# Patient Record
Sex: MALE | Race: WHITE | NOT HISPANIC OR LATINO | Employment: OTHER | URBAN - METROPOLITAN AREA
[De-identification: names, ages, dates, MRNs, and addresses within clinical notes are randomized per-mention and may not be internally consistent; named-entity substitution may affect disease eponyms.]

---

## 2018-06-06 ENCOUNTER — OFFICE VISIT CONVERTED (OUTPATIENT)
Dept: ONCOLOGY | Facility: HOSPITAL | Age: 58
End: 2018-06-06
Attending: INTERNAL MEDICINE

## 2018-06-28 ENCOUNTER — TELEPHONE CONVERTED (OUTPATIENT)
Dept: ONCOLOGY | Facility: HOSPITAL | Age: 58
End: 2018-06-28

## 2018-07-02 ENCOUNTER — OFFICE VISIT CONVERTED (OUTPATIENT)
Dept: ONCOLOGY | Facility: HOSPITAL | Age: 58
End: 2018-07-02
Attending: INTERNAL MEDICINE

## 2018-07-09 PROCEDURE — 87205 SMEAR GRAM STAIN: CPT | Performed by: ORTHOPAEDIC SURGERY

## 2018-07-09 PROCEDURE — 87070 CULTURE OTHR SPECIMN AEROBIC: CPT | Performed by: ORTHOPAEDIC SURGERY

## 2018-07-10 ENCOUNTER — LAB REQUISITION (OUTPATIENT)
Dept: LAB | Facility: HOSPITAL | Age: 58
End: 2018-07-10

## 2018-07-10 DIAGNOSIS — L08.9 LOCAL INFECTION OF SKIN AND SUBCUTANEOUS TISSUE: ICD-10-CM

## 2018-07-11 ENCOUNTER — OFFICE VISIT CONVERTED (OUTPATIENT)
Dept: ONCOLOGY | Facility: HOSPITAL | Age: 58
End: 2018-07-11
Attending: INTERNAL MEDICINE

## 2018-07-12 LAB
BACTERIA SPEC AEROBE CULT: NORMAL
GRAM STN SPEC: NORMAL
GRAM STN SPEC: NORMAL

## 2018-10-24 ENCOUNTER — OFFICE VISIT CONVERTED (OUTPATIENT)
Dept: ONCOLOGY | Facility: HOSPITAL | Age: 58
End: 2018-10-24
Attending: INTERNAL MEDICINE

## 2019-01-23 ENCOUNTER — HOSPITAL ENCOUNTER (OUTPATIENT)
Dept: CT IMAGING | Facility: HOSPITAL | Age: 59
Discharge: HOME | End: 2019-01-23
Attending: INTERNAL MEDICINE

## 2019-01-23 LAB
ALBUMIN SERPL-MCNC: 4.5 G/DL (ref 3.5–5)
ALBUMIN/GLOB SERPL: 2 {RATIO} (ref 1.4–2.6)
ALP SERPL-CCNC: 60 U/L (ref 56–119)
ALT SERPL-CCNC: 33 U/L (ref 10–40)
ANION GAP SERPL CALC-SCNC: 18 MMOL/L (ref 8–19)
AST SERPL-CCNC: 24 U/L (ref 15–50)
BASOPHILS # BLD AUTO: 0.07 10*3/UL (ref 0–0.2)
BASOPHILS NFR BLD AUTO: 0.93 % (ref 0–3)
BILIRUB SERPL-MCNC: 0.18 MG/DL (ref 0.2–1.3)
BUN SERPL-MCNC: 15 MG/DL (ref 5–25)
BUN/CREAT SERPL: 12 {RATIO} (ref 6–20)
CALCIUM SERPL-MCNC: 9.4 MG/DL (ref 8.7–10.4)
CHLORIDE SERPL-SCNC: 100 MMOL/L (ref 99–111)
CONV CO2: 24 MMOL/L (ref 22–32)
CONV TOTAL PROTEIN: 6.7 G/DL (ref 6.3–8.2)
CREAT UR-MCNC: 1.26 MG/DL (ref 0.7–1.2)
EOSINOPHIL # BLD AUTO: 0.53 10*3/UL (ref 0–0.7)
EOSINOPHIL # BLD AUTO: 7.03 % (ref 0–7)
ERYTHROCYTE [DISTWIDTH] IN BLOOD BY AUTOMATED COUNT: 12.3 % (ref 11.5–14.5)
GFR SERPLBLD BASED ON 1.73 SQ M-ARVRAT: >60 ML/MIN/{1.73_M2}
GLOBULIN UR ELPH-MCNC: 2.2 G/DL (ref 2–3.5)
GLUCOSE SERPL-MCNC: 140 MG/DL (ref 70–99)
HBA1C MFR BLD: 13 G/DL (ref 14–18)
HCT VFR BLD AUTO: 37.5 % (ref 42–52)
LDH SERPL-CCNC: 216 U/L (ref 120–240)
LYMPHOCYTES # BLD AUTO: 1.6 10*3/UL (ref 1–5)
MCH RBC QN AUTO: 32.4 PG (ref 27–31)
MCHC RBC AUTO-ENTMCNC: 34.6 G/DL (ref 33–37)
MCV RBC AUTO: 93.8 FL (ref 80–96)
MONOCYTES # BLD AUTO: 0.62 10*3/UL (ref 0.2–1.2)
MONOCYTES NFR BLD AUTO: 8.23 % (ref 3–10)
NEUTROPHILS # BLD AUTO: 4.67 10*3/UL (ref 2–8)
NEUTROPHILS NFR BLD AUTO: 62.4 % (ref 30–85)
NRBC BLD AUTO-RTO: 0 % (ref 0–0.01)
OSMOLALITY SERPL CALC.SUM OF ELEC: 289 MOSM/KG (ref 273–304)
PLATELET # BLD AUTO: 204 10*3/UL (ref 130–400)
PMV BLD AUTO: 8 FL (ref 7.4–10.4)
POTASSIUM SERPL-SCNC: 4 MMOL/L (ref 3.5–5.3)
RBC # BLD AUTO: 4 10*6/UL (ref 4.7–6.1)
SODIUM SERPL-SCNC: 138 MMOL/L (ref 135–147)
VARIANT LYMPHS NFR BLD MANUAL: 21.4 % (ref 20–45)
WBC # BLD AUTO: 7.48 10*3/UL (ref 4.8–10.8)

## 2019-02-21 ENCOUNTER — OFFICE VISIT CONVERTED (OUTPATIENT)
Dept: ONCOLOGY | Facility: HOSPITAL | Age: 59
End: 2019-02-21
Attending: INTERNAL MEDICINE

## 2019-02-21 ENCOUNTER — HOSPITAL ENCOUNTER (OUTPATIENT)
Dept: ONCOLOGY | Facility: HOSPITAL | Age: 59
Discharge: HOME | End: 2019-02-21
Attending: INTERNAL MEDICINE

## 2019-08-01 ENCOUNTER — HOSPITAL ENCOUNTER (OUTPATIENT)
Dept: OTHER | Facility: HOSPITAL | Age: 59
Discharge: HOME | End: 2019-08-01
Attending: INTERNAL MEDICINE

## 2019-08-01 ENCOUNTER — OFFICE VISIT CONVERTED (OUTPATIENT)
Dept: ONCOLOGY | Facility: HOSPITAL | Age: 59
End: 2019-08-01
Attending: INTERNAL MEDICINE

## 2019-08-01 LAB
ALBUMIN SERPL-MCNC: 4.4 G/DL (ref 3.5–5)
ALBUMIN/GLOB SERPL: 1.7 {RATIO} (ref 1.4–2.6)
ALP SERPL-CCNC: 53 U/L (ref 56–119)
ALT SERPL-CCNC: 15 U/L (ref 10–40)
ANION GAP SERPL CALC-SCNC: 18 MMOL/L (ref 8–19)
AST SERPL-CCNC: 19 U/L (ref 15–50)
BASOPHILS # BLD AUTO: 0.04 10*3/UL (ref 0–0.2)
BASOPHILS NFR BLD AUTO: 0.5 % (ref 0–3)
BILIRUB SERPL-MCNC: 0.17 MG/DL (ref 0.2–1.3)
BUN SERPL-MCNC: 14 MG/DL (ref 5–25)
BUN/CREAT SERPL: 10 {RATIO} (ref 6–20)
CALCIUM SERPL-MCNC: 9.1 MG/DL (ref 8.7–10.4)
CHLORIDE SERPL-SCNC: 102 MMOL/L (ref 99–111)
CONV ABS IMM GRAN: 0.04 10*3/UL (ref 0–0.2)
CONV CO2: 25 MMOL/L (ref 22–32)
CONV IMMATURE GRAN: 0.5 % (ref 0–1.8)
CONV TOTAL PROTEIN: 7 G/DL (ref 6.3–8.2)
CREAT UR-MCNC: 1.34 MG/DL (ref 0.7–1.2)
DEPRECATED RDW RBC AUTO: 45.9 FL (ref 35.1–43.9)
EOSINOPHIL # BLD AUTO: 0.38 10*3/UL (ref 0–0.7)
EOSINOPHIL # BLD AUTO: 5 % (ref 0–7)
ERYTHROCYTE [DISTWIDTH] IN BLOOD BY AUTOMATED COUNT: 13.4 % (ref 11.6–14.4)
GFR SERPLBLD BASED ON 1.73 SQ M-ARVRAT: 57 ML/MIN/{1.73_M2}
GLOBULIN UR ELPH-MCNC: 2.6 G/DL (ref 2–3.5)
GLUCOSE SERPL-MCNC: 96 MG/DL (ref 70–99)
HBA1C MFR BLD: 10.7 G/DL (ref 14–18)
HCT VFR BLD AUTO: 32.2 % (ref 42–52)
LDH SERPL-CCNC: 213 U/L (ref 120–240)
LYMPHOCYTES # BLD AUTO: 1.43 10*3/UL (ref 1–5)
MCH RBC QN AUTO: 31.2 PG (ref 27–31)
MCHC RBC AUTO-ENTMCNC: 33.2 G/DL (ref 33–37)
MCV RBC AUTO: 93.9 FL (ref 80–96)
MONOCYTES # BLD AUTO: 0.62 10*3/UL (ref 0.2–1.2)
MONOCYTES NFR BLD AUTO: 8.1 % (ref 3–10)
NEUTROPHILS # BLD AUTO: 5.16 10*3/UL (ref 2–8)
NEUTROPHILS NFR BLD AUTO: 67.3 % (ref 30–85)
NRBC CBCN: 0 % (ref 0–0.7)
OSMOLALITY SERPL CALC.SUM OF ELEC: 290 MOSM/KG (ref 273–304)
PLATELET # BLD AUTO: 178 10*3/UL (ref 130–400)
PMV BLD AUTO: 11.1 FL (ref 9.4–12.4)
POTASSIUM SERPL-SCNC: 4.5 MMOL/L (ref 3.5–5.3)
RBC # BLD AUTO: 3.43 10*6/UL (ref 4.7–6.1)
SODIUM SERPL-SCNC: 140 MMOL/L (ref 135–147)
VARIANT LYMPHS NFR BLD MANUAL: 18.6 % (ref 20–45)
WBC # BLD AUTO: 7.67 10*3/UL (ref 4.8–10.8)

## 2019-08-28 ENCOUNTER — HOSPITAL ENCOUNTER (OUTPATIENT)
Dept: ONCOLOGY | Facility: HOSPITAL | Age: 59
Discharge: HOME | End: 2019-08-28
Attending: INTERNAL MEDICINE

## 2019-08-28 ENCOUNTER — OFFICE VISIT CONVERTED (OUTPATIENT)
Dept: ONCOLOGY | Facility: HOSPITAL | Age: 59
End: 2019-08-28
Attending: INTERNAL MEDICINE

## 2019-10-03 ENCOUNTER — OFFICE VISIT CONVERTED (OUTPATIENT)
Dept: ONCOLOGY | Facility: HOSPITAL | Age: 59
End: 2019-10-03
Attending: INTERNAL MEDICINE

## 2019-10-03 ENCOUNTER — HOSPITAL ENCOUNTER (OUTPATIENT)
Dept: ONCOLOGY | Facility: HOSPITAL | Age: 59
Discharge: HOME | End: 2019-10-03
Attending: INTERNAL MEDICINE

## 2019-12-09 ENCOUNTER — HOSPITAL ENCOUNTER (OUTPATIENT)
Dept: CT IMAGING | Facility: HOSPITAL | Age: 59
Discharge: HOME | End: 2019-12-09
Attending: INTERNAL MEDICINE

## 2019-12-09 LAB
ALBUMIN SERPL-MCNC: 4.3 G/DL (ref 3.5–5)
ALBUMIN/GLOB SERPL: 1.4 {RATIO} (ref 1.4–2.6)
ALP SERPL-CCNC: 90 U/L (ref 56–119)
ALT SERPL-CCNC: 28 U/L (ref 10–40)
ANION GAP SERPL CALC-SCNC: 19 MMOL/L (ref 8–19)
AST SERPL-CCNC: 18 U/L (ref 15–50)
BASOPHILS # BLD AUTO: 0.08 10*3/UL (ref 0–0.2)
BASOPHILS NFR BLD AUTO: 1 % (ref 0–3)
BILIRUB SERPL-MCNC: 0.16 MG/DL (ref 0.2–1.3)
BUN SERPL-MCNC: 18 MG/DL (ref 5–25)
BUN/CREAT SERPL: 12 {RATIO} (ref 6–20)
CALCIUM SERPL-MCNC: 9.6 MG/DL (ref 8.7–10.4)
CHLORIDE SERPL-SCNC: 97 MMOL/L (ref 99–111)
CONV ABS IMM GRAN: 0.13 10*3/UL (ref 0–0.2)
CONV CO2: 26 MMOL/L (ref 22–32)
CONV IMMATURE GRAN: 1.7 % (ref 0–1.8)
CONV TOTAL PROTEIN: 7.3 G/DL (ref 6.3–8.2)
CREAT BLD-MCNC: 1.5 MG/DL (ref 0.6–1.4)
CREAT UR-MCNC: 1.45 MG/DL (ref 0.7–1.2)
DEPRECATED RDW RBC AUTO: 49.9 FL (ref 35.1–43.9)
EOSINOPHIL # BLD AUTO: 0.49 10*3/UL (ref 0–0.7)
EOSINOPHIL # BLD AUTO: 6.3 % (ref 0–7)
ERYTHROCYTE [DISTWIDTH] IN BLOOD BY AUTOMATED COUNT: 14.6 % (ref 11.6–14.4)
GFR SERPLBLD BASED ON 1.73 SQ M-ARVRAT: 50 ML/MIN/{1.73_M2}
GFR SERPLBLD BASED ON 1.73 SQ M-ARVRAT: 52 ML/MIN/{1.73_M2}
GLOBULIN UR ELPH-MCNC: 3 G/DL (ref 2–3.5)
GLUCOSE SERPL-MCNC: 111 MG/DL (ref 70–99)
HCT VFR BLD AUTO: 34.1 % (ref 42–52)
HGB BLD-MCNC: 11.4 G/DL (ref 14–18)
LDH SERPL-CCNC: 188 U/L (ref 120–240)
LYMPHOCYTES # BLD AUTO: 1.28 10*3/UL (ref 1–5)
LYMPHOCYTES NFR BLD AUTO: 16.3 % (ref 20–45)
MCH RBC QN AUTO: 30.7 PG (ref 27–31)
MCHC RBC AUTO-ENTMCNC: 33.4 G/DL (ref 33–37)
MCV RBC AUTO: 91.9 FL (ref 80–96)
MONOCYTES # BLD AUTO: 0.74 10*3/UL (ref 0.2–1.2)
MONOCYTES NFR BLD AUTO: 9.4 % (ref 3–10)
NEUTROPHILS # BLD AUTO: 5.12 10*3/UL (ref 2–8)
NEUTROPHILS NFR BLD AUTO: 65.3 % (ref 30–85)
NRBC CBCN: 0 % (ref 0–0.7)
OSMOLALITY SERPL CALC.SUM OF ELEC: 287 MOSM/KG (ref 273–304)
PLATELET # BLD AUTO: 248 10*3/UL (ref 130–400)
PMV BLD AUTO: 10.2 FL (ref 9.4–12.4)
POTASSIUM SERPL-SCNC: 4.7 MMOL/L (ref 3.5–5.3)
RBC # BLD AUTO: 3.71 10*6/UL (ref 4.7–6.1)
SODIUM SERPL-SCNC: 137 MMOL/L (ref 135–147)
WBC # BLD AUTO: 7.84 10*3/UL (ref 4.8–10.8)

## 2019-12-11 ENCOUNTER — OFFICE VISIT CONVERTED (OUTPATIENT)
Dept: ONCOLOGY | Facility: HOSPITAL | Age: 59
End: 2019-12-11
Attending: INTERNAL MEDICINE

## 2019-12-11 ENCOUNTER — HOSPITAL ENCOUNTER (OUTPATIENT)
Dept: ONCOLOGY | Facility: HOSPITAL | Age: 59
Discharge: HOME | End: 2019-12-11
Attending: INTERNAL MEDICINE

## 2020-06-29 ENCOUNTER — HOSPITAL ENCOUNTER (OUTPATIENT)
Dept: CT IMAGING | Facility: HOSPITAL | Age: 60
Discharge: HOME | End: 2020-06-29
Attending: INTERNAL MEDICINE

## 2020-06-29 LAB
ALBUMIN SERPL-MCNC: 4.3 G/DL (ref 3.5–5)
ALBUMIN/GLOB SERPL: 1.5 {RATIO} (ref 1.4–2.6)
ALP SERPL-CCNC: 90 U/L (ref 56–155)
ALT SERPL-CCNC: 19 U/L (ref 10–40)
ANION GAP SERPL CALC-SCNC: 17 MMOL/L (ref 8–19)
AST SERPL-CCNC: 15 U/L (ref 15–50)
BASOPHILS # BLD AUTO: 0.03 10*3/UL (ref 0–0.2)
BASOPHILS NFR BLD AUTO: 0.3 % (ref 0–3)
BILIRUB SERPL-MCNC: 0.29 MG/DL (ref 0.2–1.3)
BUN SERPL-MCNC: 17 MG/DL (ref 5–25)
BUN/CREAT SERPL: 10 {RATIO} (ref 6–20)
CALCIUM SERPL-MCNC: 9.7 MG/DL (ref 8.7–10.4)
CHLORIDE SERPL-SCNC: 92 MMOL/L (ref 99–111)
CONV ABS IMM GRAN: 0.08 10*3/UL (ref 0–0.2)
CONV CO2: 24 MMOL/L (ref 22–32)
CONV IMMATURE GRAN: 0.9 % (ref 0–1.8)
CONV TOTAL PROTEIN: 7.1 G/DL (ref 6.3–8.2)
CREAT BLD-MCNC: 1.8 MG/DL (ref 0.6–1.4)
CREAT UR-MCNC: 1.74 MG/DL (ref 0.7–1.2)
DEPRECATED RDW RBC AUTO: 43.1 FL (ref 35.1–43.9)
EOSINOPHIL # BLD AUTO: 0.38 10*3/UL (ref 0–0.7)
EOSINOPHIL # BLD AUTO: 4.4 % (ref 0–7)
ERYTHROCYTE [DISTWIDTH] IN BLOOD BY AUTOMATED COUNT: 13.2 % (ref 11.6–14.4)
GFR SERPLBLD BASED ON 1.73 SQ M-ARVRAT: 40 ML/MIN/{1.73_M2}
GFR SERPLBLD BASED ON 1.73 SQ M-ARVRAT: 41 ML/MIN/{1.73_M2}
GLOBULIN UR ELPH-MCNC: 2.8 G/DL (ref 2–3.5)
GLUCOSE SERPL-MCNC: 403 MG/DL (ref 70–99)
HCT VFR BLD AUTO: 33.8 % (ref 42–52)
HGB BLD-MCNC: 11.4 G/DL (ref 14–18)
LDH SERPL-CCNC: 234 U/L (ref 120–240)
LYMPHOCYTES # BLD AUTO: 1.43 10*3/UL (ref 1–5)
LYMPHOCYTES NFR BLD AUTO: 16.5 % (ref 20–45)
MCH RBC QN AUTO: 30.2 PG (ref 27–31)
MCHC RBC AUTO-ENTMCNC: 33.7 G/DL (ref 33–37)
MCV RBC AUTO: 89.7 FL (ref 80–96)
MONOCYTES # BLD AUTO: 0.67 10*3/UL (ref 0.2–1.2)
MONOCYTES NFR BLD AUTO: 7.7 % (ref 3–10)
NEUTROPHILS # BLD AUTO: 6.08 10*3/UL (ref 2–8)
NEUTROPHILS NFR BLD AUTO: 70.2 % (ref 30–85)
NRBC CBCN: 0 % (ref 0–0.7)
OSMOLALITY SERPL CALC.SUM OF ELEC: 286 MOSM/KG (ref 273–304)
PLATELET # BLD AUTO: 211 10*3/UL (ref 130–400)
PMV BLD AUTO: 11.3 FL (ref 9.4–12.4)
POTASSIUM SERPL-SCNC: 4.3 MMOL/L (ref 3.5–5.3)
RBC # BLD AUTO: 3.77 10*6/UL (ref 4.7–6.1)
SODIUM SERPL-SCNC: 129 MMOL/L (ref 135–147)
WBC # BLD AUTO: 8.67 10*3/UL (ref 4.8–10.8)

## 2020-07-01 ENCOUNTER — OFFICE VISIT CONVERTED (OUTPATIENT)
Dept: ONCOLOGY | Facility: HOSPITAL | Age: 60
End: 2020-07-01
Attending: INTERNAL MEDICINE

## 2020-12-29 ENCOUNTER — HOSPITAL ENCOUNTER (OUTPATIENT)
Dept: CT IMAGING | Facility: HOSPITAL | Age: 60
Discharge: HOME | End: 2020-12-29
Attending: INTERNAL MEDICINE

## 2020-12-29 LAB
ALBUMIN SERPL-MCNC: 4.1 G/DL (ref 3.5–5)
ALBUMIN/GLOB SERPL: 1.6 {RATIO} (ref 1.4–2.6)
ALP SERPL-CCNC: 110 U/L (ref 56–155)
ALT SERPL-CCNC: 15 U/L (ref 10–40)
ANION GAP SERPL CALC-SCNC: 16 MMOL/L (ref 8–19)
AST SERPL-CCNC: 16 U/L (ref 15–50)
BASOPHILS # BLD AUTO: 0.07 10*3/UL (ref 0–0.2)
BASOPHILS NFR BLD AUTO: 0.5 % (ref 0–3)
BILIRUB SERPL-MCNC: 0.21 MG/DL (ref 0.2–1.3)
BUN SERPL-MCNC: 15 MG/DL (ref 5–25)
BUN/CREAT SERPL: 9 {RATIO} (ref 6–20)
CALCIUM SERPL-MCNC: 9.1 MG/DL (ref 8.7–10.4)
CHLORIDE SERPL-SCNC: 98 MMOL/L (ref 99–111)
CONV ABS IMM GRAN: 0.11 10*3/UL (ref 0–0.2)
CONV CO2: 30 MMOL/L (ref 22–32)
CONV IMMATURE GRAN: 0.7 % (ref 0–1.8)
CONV TOTAL PROTEIN: 6.7 G/DL (ref 6.3–8.2)
CREAT BLD-MCNC: 1.8 MG/DL (ref 0.6–1.4)
CREAT UR-MCNC: 1.7 MG/DL (ref 0.7–1.2)
DEPRECATED RDW RBC AUTO: 50.9 FL (ref 35.1–43.9)
EOSINOPHIL # BLD AUTO: 0.9 10*3/UL (ref 0–0.7)
EOSINOPHIL # BLD AUTO: 6.1 % (ref 0–7)
ERYTHROCYTE [DISTWIDTH] IN BLOOD BY AUTOMATED COUNT: 15.3 % (ref 11.6–14.4)
GFR SERPLBLD BASED ON 1.73 SQ M-ARVRAT: 40 ML/MIN/{1.73_M2}
GFR SERPLBLD BASED ON 1.73 SQ M-ARVRAT: 42 ML/MIN/{1.73_M2}
GLOBULIN UR ELPH-MCNC: 2.6 G/DL (ref 2–3.5)
GLUCOSE SERPL-MCNC: 109 MG/DL (ref 70–99)
HCT VFR BLD AUTO: 42.7 % (ref 42–52)
HGB BLD-MCNC: 13.5 G/DL (ref 14–18)
LDH SERPL-CCNC: 213 U/L (ref 120–240)
LYMPHOCYTES # BLD AUTO: 2.09 10*3/UL (ref 1–5)
LYMPHOCYTES NFR BLD AUTO: 14.1 % (ref 20–45)
MCH RBC QN AUTO: 28.5 PG (ref 27–31)
MCHC RBC AUTO-ENTMCNC: 31.6 G/DL (ref 33–37)
MCV RBC AUTO: 90.3 FL (ref 80–96)
MONOCYTES # BLD AUTO: 1.14 10*3/UL (ref 0.2–1.2)
MONOCYTES NFR BLD AUTO: 7.7 % (ref 3–10)
NEUTROPHILS # BLD AUTO: 10.49 10*3/UL (ref 2–8)
NEUTROPHILS NFR BLD AUTO: 70.9 % (ref 30–85)
NRBC CBCN: 0 % (ref 0–0.7)
OSMOLALITY SERPL CALC.SUM OF ELEC: 289 MOSM/KG (ref 273–304)
PLATELET # BLD AUTO: 222 10*3/UL (ref 130–400)
PMV BLD AUTO: 10.9 FL (ref 9.4–12.4)
POTASSIUM SERPL-SCNC: 4.7 MMOL/L (ref 3.5–5.3)
RBC # BLD AUTO: 4.73 10*6/UL (ref 4.7–6.1)
SODIUM SERPL-SCNC: 139 MMOL/L (ref 135–147)
WBC # BLD AUTO: 14.8 10*3/UL (ref 4.8–10.8)

## 2020-12-30 ENCOUNTER — HOSPITAL ENCOUNTER (OUTPATIENT)
Dept: ONCOLOGY | Facility: HOSPITAL | Age: 60
Discharge: HOME | End: 2020-12-30
Attending: INTERNAL MEDICINE

## 2020-12-30 ENCOUNTER — OFFICE VISIT CONVERTED (OUTPATIENT)
Dept: ONCOLOGY | Facility: HOSPITAL | Age: 60
End: 2020-12-30
Attending: INTERNAL MEDICINE

## 2021-03-31 ENCOUNTER — OFFICE VISIT CONVERTED (OUTPATIENT)
Dept: ONCOLOGY | Facility: HOSPITAL | Age: 61
End: 2021-03-31
Attending: INTERNAL MEDICINE

## 2021-03-31 ENCOUNTER — HOSPITAL ENCOUNTER (OUTPATIENT)
Dept: ONCOLOGY | Facility: HOSPITAL | Age: 61
Discharge: HOME | End: 2021-03-31
Attending: INTERNAL MEDICINE

## 2021-05-28 VITALS
RESPIRATION RATE: 18 BRPM | OXYGEN SATURATION: 97 % | OXYGEN SATURATION: 97 % | OXYGEN SATURATION: 97 % | OXYGEN SATURATION: 97 % | BODY MASS INDEX: 41.71 KG/M2 | BODY MASS INDEX: 42.51 KG/M2 | SYSTOLIC BLOOD PRESSURE: 127 MMHG | WEIGHT: 287.04 LBS | HEART RATE: 57 BPM | HEART RATE: 60 BPM | HEIGHT: 69 IN | RESPIRATION RATE: 12 BRPM | SYSTOLIC BLOOD PRESSURE: 159 MMHG | BODY MASS INDEX: 40.68 KG/M2 | HEART RATE: 73 BPM | TEMPERATURE: 97.9 F | TEMPERATURE: 97.8 F | WEIGHT: 276.24 LBS | HEART RATE: 65 BPM | DIASTOLIC BLOOD PRESSURE: 69 MMHG | SYSTOLIC BLOOD PRESSURE: 148 MMHG | RESPIRATION RATE: 18 BRPM | DIASTOLIC BLOOD PRESSURE: 74 MMHG | TEMPERATURE: 99.1 F | HEIGHT: 69 IN | TEMPERATURE: 97.6 F | RESPIRATION RATE: 16 BRPM | BODY MASS INDEX: 41.16 KG/M2 | BODY MASS INDEX: 41.04 KG/M2 | RESPIRATION RATE: 20 BRPM | WEIGHT: 277.78 LBS | WEIGHT: 284.83 LBS | BODY MASS INDEX: 40.91 KG/M2 | DIASTOLIC BLOOD PRESSURE: 61 MMHG | TEMPERATURE: 97.9 F | OXYGEN SATURATION: 99 % | TEMPERATURE: 98.4 F | HEART RATE: 66 BPM | HEART RATE: 63 BPM | RESPIRATION RATE: 20 BRPM | SYSTOLIC BLOOD PRESSURE: 150 MMHG | WEIGHT: 281.09 LBS | DIASTOLIC BLOOD PRESSURE: 70 MMHG | DIASTOLIC BLOOD PRESSURE: 67 MMHG | HEIGHT: 69 IN | SYSTOLIC BLOOD PRESSURE: 174 MMHG | DIASTOLIC BLOOD PRESSURE: 70 MMHG | OXYGEN SATURATION: 97 % | SYSTOLIC BLOOD PRESSURE: 150 MMHG | WEIGHT: 277.12 LBS

## 2021-05-28 VITALS
TEMPERATURE: 98.9 F | WEIGHT: 288.8 LBS | DIASTOLIC BLOOD PRESSURE: 76 MMHG | BODY MASS INDEX: 42.29 KG/M2 | OXYGEN SATURATION: 95 % | SYSTOLIC BLOOD PRESSURE: 165 MMHG | HEART RATE: 93 BPM | RESPIRATION RATE: 28 BRPM

## 2021-05-28 VITALS
WEIGHT: 267.64 LBS | DIASTOLIC BLOOD PRESSURE: 77 MMHG | BODY MASS INDEX: 39.64 KG/M2 | TEMPERATURE: 97.8 F | HEART RATE: 59 BPM | OXYGEN SATURATION: 96 % | HEART RATE: 60 BPM | BODY MASS INDEX: 38.43 KG/M2 | SYSTOLIC BLOOD PRESSURE: 139 MMHG | WEIGHT: 259.48 LBS | OXYGEN SATURATION: 98 % | DIASTOLIC BLOOD PRESSURE: 66 MMHG | TEMPERATURE: 98.4 F | HEIGHT: 69 IN | SYSTOLIC BLOOD PRESSURE: 137 MMHG | HEIGHT: 69 IN | RESPIRATION RATE: 16 BRPM

## 2021-05-28 VITALS
WEIGHT: 264.25 LBS | HEART RATE: 68 BPM | HEIGHT: 70 IN | SYSTOLIC BLOOD PRESSURE: 166 MMHG | DIASTOLIC BLOOD PRESSURE: 70 MMHG | OXYGEN SATURATION: 93 % | TEMPERATURE: 98 F | BODY MASS INDEX: 37.83 KG/M2

## 2021-05-28 VITALS
TEMPERATURE: 97 F | HEART RATE: 91 BPM | DIASTOLIC BLOOD PRESSURE: 73 MMHG | SYSTOLIC BLOOD PRESSURE: 155 MMHG | OXYGEN SATURATION: 98 % | WEIGHT: 268.96 LBS | RESPIRATION RATE: 24 BRPM | BODY MASS INDEX: 39.39 KG/M2

## 2021-05-28 NOTE — PROGRESS NOTES
"Patient: GUERDA WESTON     Acct: [id number]     Report: #[id number]  UNIT #: [id number]     : 1960    Encounter Date:2018  PRIMARY CARE: WALLACE RODRIGUEZ  ***Signed***  --------------------------------------------------------------------------------------------------------------------  DATE: 18      PCP not in lookup:        Wallace Rodriguez      Referring Provider:  A      Referring Provider not in look:        SUE Dia      Reason For Consult      New Patient NHL/ Ref.            History of Present Illness      58-year-old white male has been referred because of non-Hodgkin's lymphoma     diagnosed 4-5 months ago.  Patient and wife request a second opinion concerning     his chemotherapy.             History started when he felt a knot on the left groin which on  biopsy showed     non-Hodgkin's lymphoma.  Patient had felt other nodes under her his left     collarbone.      Patient was given chemotherapy every 3 weeks 4-5 times.            On last chemotherapy dose he had a 'red drug\"  directly pushed in the dorsum of     his hand.  According to them that chemotherapy \"leaked out from his hand\" and     was told that he would have blisters in 4-5 days.  Blisters had gotten worse     and is now a deep ulcer.  He has been seen by Dr. Dia for debridement.            His lymphoma has improved and he has to finish his treatment but because of the     complications of the extravasation he wanted a second opinion concerning     further treatments.            Past Medical/Surgical History             Hypertension             Diabetes Mellitus             No Heart Disease             No Blood Clots             Cancer             No Lung Disease             No Kidney Disease             Other (rheumatoid arthritis, osteoarthritis)            Colectomy for colon abscess 11 years ago      Bilateral knee surgery      Neck surgery      Surgery for sleep apnea            Pyschiatric History      Depression, " Anxiety, No Panic Attacks, Bipolar, No Other            Social History      Social History:  Tobacco Use (2 pack for 30 years), No Alcohol Use,     Recreational Drug use (prescribed medication), No Other            Family History      Hypertension (father), No Diabetes Mellitus, Blood Clots (father), Cancer (    mother, brother, non-Hodgkin's lymphoma, lung cancer, throat cancer), No Lung     Disease, No Kidney Disease, No Other            Allergies      Coded Allergies:             No Known Drug Allergies (Verified  Allergy, 6/6/18)            Medications      Medications    Last Reconciled on 6/22/18 15:07 by VERNA CALLOWAY MD      Metformin Hcl (Metformin Hcl*) 500 Mg Tablet      500 MG PO BID, #60 TAB 0 Refills         Reported         6/6/18       Omeprazole (PriLOSEC*) 40 Mg Capsule.dr      40 MG PO HS for 30 Days, #30 CAP 0 Refills         Reported         6/6/18       Citalopram HBr (CeleXA) 40 Mg Tablet      40 MG PO HS, #30 TAB 0 Refills         Reported         6/6/18       Carbamazepine (TEGretol-XR*) 200 Mg Tab.er.12h      200 MG PO BID, #60 TAB.SR 0 Refills         Reported         6/6/18       Promethazine HCl (Phenergan*) 25 Mg Tablet      25 MG PO BID, #60 TAB 0 Refills         Reported         6/6/18       Hydrocodone/Acetaminophen 10/325 MG (Hydrocodone/Acetaminophen 10/325 MG) 1     Each Tablet      1 TAB PO Q4H Y for BREAKTHROUGH PAIN, TAB 0 Refills         Reported         6/6/18       Hctz/Spironolactone 25/25 Mg (Aldactazide 25/25 Mg) 1 Each Tablet      1 TAB PO QDAY, #30 TAB         Reported         6/6/18       LORazepam (LORazepam) 1 Mg Tablet      1 MG PO BID, TAB         Reported         6/6/18       Fenofibrate (Fenofibrate*) 48 Mg Tablet      48 MG PO QDAY, TAB         Reported         6/6/18      Current Medications      Current Medications Reviewed 6/6/18            Pain Assessment      Pain Intensity:  8 (right hand)      Duration:  Continuous            Review of Systems      Abnormal  as noted below; all other systems have been reviewed and are negative.      General:  No Anxiety, Fatigue Scale: (8), Pain Scale: (8), No Fever, Other (no     fevers no night sweats)      HEENT:  No Dysphagia, No Hearing Changes, No Rhinorrhea, No Tinnitus, No Visual     Changes, No Nasal Congestion, No Epistaxis, No Other      Respiratory:  No Cough, No Shortness of Air, No Sputum Changes, Wheezing, No     Hemoptysis, No Congestion, No Other      Cardiovascular:  Chest Pain, No Pedal Edema, No Orthopnea, No Palpitations, No     Chest Pressure, No Dizziness, No Other      Gastrointestinal:  No Nausea, No Vomiting, No Dysphagia, Constipation, Diarrhea    , No Appetite Good, No Appetite Fair, No Appetite Poor, No Early Satiety, Other     (no weight loss)      Genitourinary:  No Nocturia, No Dysuria, No Other      Musculoskeletal:  No Joint Effusions, No Joint Tenderness, No Joint Stiffness,     No Myalgias, No Aches, No Pains, No Other      Endocrine:  No Heat Intolerance, No Cold Intolerance, No Fatigue, No Blood     Sugar Control, No Other      Hematologic:  Bleeding, Bruising, No Swollen Glands, No Other      Allergic/Immunologic:  No Hives, No Throat closing off, No Nasal drip, No Itchy     eyes, No Hay fever, No Other      Psychological:  Anxiety, Depression, No Other      Neurological:  No Headaches, Dizziness, No Weakness, Numbness (hand), No Other      Skin:  No Rash, Open Wounds, No Edema, No Other      Vitals:             Height 5 ft 10 in / 177.8 cm           Weight 264 lbs 4 oz / 119.522937 kg           BSA 2.48 m2           BMI 37.9 kg/m2           Temperature 98.0 F / 36.67 C - Temporal           Pulse 68           Blood Pressure 166/70 Sitting, Left Arm           Pulse Oximetry 93%, room air            Exam      Constitutional:  No acute distress, Conversant, Pleasant, No Weakness      Eyes:  Anicteric sclerae, Palpebral Conjunctivae (pink), CAMMY      HENT:  Oropharynx clear, No Erythema, No Exudate,  No Tonsils, Buccal mucosae (    pink)      Neck:  Supple, Full Range of Motion      Lungs:  Clear to Ausculation, Normal Respiratory Effort      Cardiovascular:  RRR, No Murmurs, Normal PMI, No Peripheral Edema      Abdomen:  Soft, NABS, No Masses, No Splenomegaly, No Tenderness      Skin:  Normal temperature, Normal tone, Other (dressing on the left 10 patient     has a deep ulcer with yellowish material on the surface.  Patient is to see Dr. iDa today.  Intertrigo inguinal regions)      Extremities:  No digital cyanosis, Normal gait, Other (no deformity, no     mutation range of motion)      Psychiatric:  Appropriate affect, Intact judgement      Neurological:  Cranial Nerve II-XII, No Focal Sensory deficits      Lymphatic:  No Neck, No Axillae, No Groin            Radiology      On October 2017 he had possible necrotic lymph node in area of left groin      CAT scan of the chest showed mild prominent middle mediastinal lymph nodes     stable since 2016 probably benign prominent coronary artery calcification      CAT scan of the neck done on 11/21/2017 showed a solitary left supraclavicular     region lateral to the mid left thyroid measuring 13 x 20 mm      CT of the abdomen and pelvis showed left inguinal adenopathy which has been     biopsied      PET CT scan done on 11/28/2017 showed abnormal lymph node in the left groin     measures 3.4 x 2.0 cm with an SUV of 11.8.      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  Patient with elevated LDH on presentation.      No bone marrow studies performed.  NCCN - IPI score      Low intermediate to high intermediate.  According to his records patient has     night sweats but on questioning patient says he is asymptomatic.   Patient     received 5 courses of   RCHOP.  Last treatment given March 22            Deep wound ulcer dorsum of the left hand from Adriamycin extravasation      History of depression      Intertrigo inguinal regions      Hypertension      Diabetes mellitus2      Benign prostatic hypertrophy      Rheumatoid arthritis      Osteoarthritis      Status post colectomy for colon abscess      Bilateral knee surgery      Cervical surgery for disc rupture      Status post surgery for sleep apnea            Plan      Review of his medical records from [place].      PET CT scan      CBC, CMP, LDH            Consider referral to bone marrow transplant unit.      Double hit lymphoma are more aggressive.  They are at higher risk for CNS     involvement and bone marrow involvement.      Thank you very much for allowing me to participate in the care of your patient.      I will keep you posted on the progress of his workup.            Patient Education:        DI for Non-Hodgkin's Lymphoma            PREVENTION      Hx Influenza Vaccination:  No      Influenza Vaccine Declined:  Yes      2 or More Falls Past Year?:  No      Fall Past Year with Injury?:  No      Hx Pneumococcal Vaccination:  No      Encouraged to follow-up with:  PCP regarding preventative exams.      Chart initiated by      Deanna Carlisle MA                 Disclaimer: Converted document may not contain table formatting or lab diagrams. Please see [facility] for the authenticated document.

## 2021-05-28 NOTE — PROGRESS NOTES
Patient: GUERDA WESTON     Acct: [id number]     Report: #[id number]  UNIT #: [id number]     : 1960    Encounter Date:2020  PRIMARY CARE: WALLACE RODRIGUEZ  ***Signed***  --------------------------------------------------------------------------------------------------------------------  NURSE INTAKE      Visit Type      Established Patient Visit            Chief Complaint      B CELL LYMPHOMA F/U            Referring Provider/Copies To      Primary Care Provider:  WALLACE RODRIGUEZ      Copies To:   WALLACE RODRIGUEZ            History and Present Illness      Past Oncology Illness History      Mr. House is a very pleasant 58-year-old gentleman with diffuse large cell B-    cell lymphoma, stage IIIB diagnosed in 2017. Patient has been treated     now with 5 cycles of CHOP plus Rituxan with which he was able to achieve a     complete remission. His most recent PET scan is unremarkable for any residual     uptake. Unfortunately his course was been complicated by significant tissue     necrosis and ulceration of the right hand secondary to chemotherapy     infiltration, most likely related to anthracyclines. Patient continued to be     very concerned about dysfunction of his right hand, he is currently under the     care of the wound management at  but is not satisfied with     his recovery.  As far as lymphoma is concerned he is in complete remission and     does not need any further intervention at this stage even though he has received    5 of the intended 6 cycles of chemoimmunotherapy.  Pt seeing Dr. Donovan, hand     surgeon at [facility] who is planning to do repair surgery and possibly     grafting.            HPI - Oncology Interim      Patient returns today for follow-up of his lymphoma and is accompanied by his     wife.        He is currently on observation.  He states he has been doing well since his     last visit.  He denies any swollen or painful  lymphadenopathy.  He denies fever,    chills, weight loss, night sweats.  His energy level is low but adequate for his    daily needs.  He reports he does not exercise.  He denies excessive bruising or     bleeding.  His past complaint of right rib pain has now resolved.            Cancer Details            Left groin DLBCL-positive BCL6/MCL/CEP8 and IGH            Clinical Staging      Left groin DLBCL-positive BCL6/MCL/CEP8 and IGH            Stage IIIB            Treatments      Chemotherapy      18 completed 5 of 6 R-CHOP            Clinical Trial Participant      No            ECOG Performance Status      0            Most Recent Lab Findings      Laboratory Tests      20 10:25            Most Recent Imaging Findings               [facility]                PACS RADIOLOGY REPORT            Patient: GUERDA WESTON   Acct: #[id number]   Report: #[id number]            UNIT #: [id number]    DOS: 20 1015      INSURANCE:[employer] O   ORDER #:[id number]      LOCATION:CT     : 1960            PROVIDERS      ADMITTING:     ATTENDING: FLCAA FUNES      FAMILY:  WALLACE RODRIGUEZ   ORDERING:  FLACA FUNES         OTHER:    DICTATING:  Christopher Kraft MD            REQ #:[id number]   EXAM:CTACPWC - CT ABD CHEST PEL w CONTR      REASON FOR EXAM:  B CELL LYMPHOMA      REASON FOR VISIT:  B CELL LYMPHOMA            *******Signed with Addenda******                  ADDENDUM            This report includes an Addendum and supersedes previous reports for this exam.             PROCEDURE:   CT ABDOMEN; CT CHEST; CT PELVIS WITH CONTRAST             COMPARISON:   [facility], CT, CT CHEST ABD PEL W CONTRAST,     2020, 14:37.             INDICATIONS:   6 MONTH FOLLOW UP B CELL LYMPHOMA             TECHNIQUE:   After obtaining the patient's consent, CT images were obtained with    intravenous contrast        material.             FINDINGS:         Chest:  No mediastinal, hilar, or axillary adenopathy identified.  There are no     pleural effusions.        2 mm noncalcified subpleural nodule within the right lung apex is unchanged,     axial image 21.  There       is a new 3 mm subpleural noncalcified pulmonary nodule more anteriorly within     the right upper lobe       axial image 36.  This is most likely infectious or inflammatory but continued     serial CT follow-up       would be recommended.  No other new noncalcified pulmonary nodule identified.      There is some       minimal ground-glass subpleural opacity within the right lower lobe posteriorly     consistent with       atelectasis.             Abdomen:  Fatty infiltration of the liver has improved.  No focal hepatic mass     identified.  Patient       is status post cholecystectomy.  No evidence of biliary tract obstruction.  The     pancreas and spleen       are within normal limits.  Bilateral adrenal glands appear normal.  Again seen     is a large cyst of       the upper pole the left kidney.  Smaller cyst is seen along the lateral cortex     of the mid left       kidney unchanged from prior study.  There are additional cyst involving the     lower poles of both       kidneys.  No suspicious contrast enhancing renal lesion identified.  Upper pole     cyst of the right       kidney is stable.  There is a stable right retrocrural node measuring 1.7 x 0.9     cm in size.  There       is an enlarging group of nodes above the celiac artery to left midline measuring    1.8 x 0.9 cm in       size.  Previously there were 2 small 3-4 mm size nodes in this region.  No other    definite enlarging       retroperitoneal or abdominal lymph nodes identified.  The upper GI tract appears    normal.             Pelvis:  Urinary bladder is within normal limits.  There are multiple sigmoid     diverticula but no       evidence of diverticulitis.  The appendix is normal.   There is stable bilateral     internal and       external iliac nodes.  No new or enlarging pelvic or inguinal nodes identified.             Bones:  There are degenerative changes of the spine.  No lytic or sclerotic bony    lesion identified.             CONCLUSION:         1. New 3 mm subpleural noncalcified nodule within the right upper lobe.  No     other new or enlarging       pulmonary nodule identified.      2. No new or enlarging adenopathy within the chest.      3. Interval enlargement of a group of nodes above the celiac artery now     measuring 1.7 x 0.9 cm in       size, previously there were 2 nodes in this region measuring only 3-4 mm in     size.  No other new or       enlarging lymph nodes seen within the abdomen or pelvis.      4. Colonic diverticulosis but no evidence diverticulitis      5. Stable appearance of multiple bilateral renal cyst.              KRAIG BRENNAN MD             Electronically Signed and Approved By: KRAIG BRENNAN MD on 12/29/2020 at 13:01            ADDENDUM:              Additional findings:  There is a mild compression fracture involving the     superior endplate of the       T11 vertebral body.  There is only approximately 10% loss of height.  This is     age indeterminate but       new from 6/29/2020.  There has been interval development of sclerosis of the T11    vertebral body       which could be related to metastatic disease.  Compacted bone and healing     related to the       compression fracture could also give this appearance.  There are no other     fractures identified.  No       other lytic or sclerotic bony lesions are seen.                KRAIG BRENNAN MD             Electronically Signed and Approved By: KRAIG BRENNAN MD on 12/29/2020 at 14:05                                        12/29/20 1408            Christopher Brennan MD            STEBA:      D:12/29/20 1404      T:              PROCEDURE:   CT ABDOMEN; CT CHEST; CT PELVIS WITH CONTRAST              COMPARISON:   [facility], CT, CT CHEST ABD PEL W CONTRAST,     6/29/2020, 14:37.             INDICATIONS:   6 MONTH FOLLOW UP B CELL LYMPHOMA             TECHNIQUE:   After obtaining the patient's consent, CT images were obtained with    intravenous contrast       material.             FINDINGS:         Chest:  No mediastinal, hilar, or axillary adenopathy identified.  There are no     pleural effusions.        2 mm noncalcified subpleural nodule within the right lung apex is unchanged,     axial image 21.  There       is a new 3 mm subpleural noncalcified pulmonary nodule more anteriorly within     the right upper lobe       axial image 36.  This is most likely infectious or inflammatory but continued     serial CT follow-up       would be recommended.  No other new noncalcified pulmonary nodule identified.      There is some       minimal ground-glass subpleural opacity within the right lower lobe posteriorly     consistent with       atelectasis.             Abdomen:  Fatty infiltration of the liver has improved.  No focal hepatic mass     identified.  Patient       is status post cholecystectomy.  No evidence of biliary tract obstruction.  The     pancreas and spleen       are within normal limits.  Bilateral adrenal glands appear normal.  Again seen     is a large cyst of       the upper pole the left kidney.  Smaller cyst is seen along the lateral cortex     of the mid left       kidney unchanged from prior study.  There are additional cyst involving the     lower poles of both       kidneys.  No suspicious contrast enhancing renal lesion identified.  Upper pole     cyst of the right       kidney is stable.  There is a stable right retrocrural node measuring 1.7 x 0.9     cm in size.  There       is an enlarging group of nodes above the celiac artery to left midline measuring    1.8 x 0.9 cm in       size.  Previously there were 2 small 3-4 mm size nodes in this region.  No other    definite  enlarging       retroperitoneal or abdominal lymph nodes identified.  The upper GI tract appears    normal.             Pelvis:  Urinary bladder is within normal limits.  There are multiple sigmoid     diverticula but no       evidence of diverticulitis.  The appendix is normal.  There is stable bilateral     internal and       external iliac nodes.  No new or enlarging pelvic or inguinal nodes identified.             Bones:  There are degenerative changes of the spine.  No lytic or sclerotic bony    lesion identified.             CONCLUSION:         1. New 3 mm subpleural noncalcified nodule within the right upper lobe.  No oth    er new or enlarging       pulmonary nodule identified.      2. No new or enlarging adenopathy within the chest.      3. Interval enlargement of a group of nodes above the celiac artery now     measuring 1.7 x 0.9 cm in       size, previously there were 2 nodes in this region measuring only 3-4 mm in     size.  No other new or       enlarging lymph nodes seen within the abdomen or pelvis.      4. Colonic diverticulosis but no evidence diverticulitis      5. Stable appearance of multiple bilateral renal cyst.              KRAIG BRENNAN MD             Electronically Signed and Approved By: KRAIG BRENNAN MD on 12/29/2020 at 13:01                                  Until signed, this is an unconfirmed preliminary report that may contain      errors and is subject to change.                                              STEBA:      D:12/29/20 1301            PAST, FAMILY   Past Medical History      Past Medical History:  Arthritis, Diabetes Type 2, High Cholesterol,     Hypertension, Sleep Apnea      Hematology/Oncology (M):  Lymphoma            Past Surgical History      Biopsy, Cholecystectomy, Fracture Repair, Joint Replacement, Spinal Surgery            Family History      Family History:  Lung Cancer, Lymphoma, Melanoma            Social History      Lives independently:  Yes      Number of  Children:  1      Occupation:  disabled            Tobacco Use      Tobacco status:  Current every day smoker      Smoking packs/day:  2            Substance Use      Substance use:  Denies use            REVIEW OF SYSTEMS      General:  Denies: Appetite Change, Fatigue, Fever, Night Sweats, Weight Gain,     Weight Loss      Eye:  Denies Blurred Vision, Denies Corrective Lenses, Denies Diplopia, Denies     Vision Changes      ENT:  Denies Headache, Denies Hearing Loss, Denies Hoarseness, Denies Sore     Throat      Cardiovascular:  Denies Chest Pain, Denies Palpitations      Respiratory:  Admits: Shortness of Air;          Denies: Cough, Coughing Blood, Productive Cough, Wheezing      Gastrointestinal:  Denies Bloody Stools, Denies Constipation, Denies Diarrhea,     Denies Nausea/Vomiting, Denies Problem Swallowing, Denies Unable to Control     Bowels      Genitourinary:  Denies Blood in Urine, Denies Incontinence, Denies Painful     Urination      Musculoskeletal:  Denies Back Pain, Denies Muscle Pain; Admits Painful Joints      Integumentary:  Denies Itching, Denies Lesions, Denies Rash      Neurologic:  Denies Dizziness, Denies Numbness\Tingling, Denies Seizures      Psychiatric:  Denies Anxiety, Denies Depression      Endocrine:  Denies Cold Intolerance, Denies Heat Intolerance      Hematologic/Lymphatic:  Denies Bruising, Denies Bleeding, Denies Enlarged Lymph     Nodes            VITAL SIGNS AND SCORES      Vitals      Weight 268 lbs 15.379 oz / 122 kg      Temperature 97 F / 36.11 C - Temporal      Pulse 91      Respirations 24      Blood Pressure 155/73 Sitting, Left Arm      Pulse Oximetry 98%, rm air            Pain Score      Experiencing any pain?:  No      Pain Scale Used:  Numerical            Fatigue Score      Experiencing any fatigue?:  No      Fatigue (0-10 scale):  0 (none)            EXAM      General Appearance:  Positive for: Alert, Oriented x3, Cooperative, Obese      Eye:  Positive for:  Anicteric Sclerae      HEENT:  Positive for: Oropharynx clear      Neck:  Positive for: Supple      Respiratory:  Positive for: CTAB, Normal Respiratory Effort      Abdomen/Gastro:  Positive for: Normal Active Bowel Sounds, Soft      Cardiovascular:  Positive for: RRR      Skin:  Positive for: Normal Temperature, Normal Texture and Turgor, Normal Tone      Psychiatric:  Positive for: AAO X 3      Neurologic:  Positive for: Cranial Ner II-XII Intact      Musculoskeletal:  Positive for: Full ROM Lower Extremety, Full ROM Upper     Extremety, Full Muscle Strength      Lymphatic:  Negative for: Axillary, Cervical, Epitrochlear, Femoral,     Infraclavicular, Inguinal, Occipital, Popliteal, Posterior auricular,     Preauricular, Supraclavicular            PREVENTION      Hx Influenza Vaccination:  Yes      Date Influenza Vaccine Given:  Dec 1, 2019      Influenza Vaccine Declined:  No      2 or More Falls in Past Year?:  No      Fall Past Year with Injury?:  No      Hx Pneumococcal Vaccination:  Yes      Encouraged to follow-up with:  PCP regarding preventative exams.      Chart initiated by      ARLENE REDDY MA            ALLERGY/MEDS      Allergies      Coded Allergies:             NO KNOWN DRUG ALLERGIES (Verified  Allergy, Unknown, 12/11/19)            Medications      Last Reconciled on 7/1/20 12:30 by FLACA FUNES      Lisinopril/HCTZ (Zestoretic 10/12.5 MG*) 1 Tab Tablet      1 TAB PO QDAY for 30 Days, #30 TAB         Reported         10/3/19       Hydroxychloroquine Sulfate (Plaquenil) 200 Mg Tab      400 MG PO BID, TAB         Reported         7/2/18       metFORMIN HCl (metFORMIN HCl) 500 Mg Tablet      500 MG PO BID, #60 TAB 0 Refills         Reported         6/6/18       Omeprazole (priLOSEC) 40 Mg Capsule.dr      40 MG PO HS for 30 Days, #30 CAP 0 Refills         Reported         6/6/18       Citalopram HBr (CeleXA) 40 Mg Tablet      40 MG PO HS, #30 TAB 0 Refills         Reported         6/6/18        carBAMazepine (TEGretol-XR) 200 Mg Tab.er.12h      200 MG PO BID, #60 TAB.SR 0 Refills         Reported         6/6/18       Promethazine HCl (Phenergan*) 25 Mg Tablet      25 MG PO BID, #60 TAB 0 Refills         Reported         6/6/18       HYDROcodone-Acetaminophen  Mg (HYDROcodone-Acetaminophen  Mg) 1 Each    Tablet      1 TAB PO Q4H PRN for BREAKTHROUGH PAIN, TAB 0 Refills         Reported         6/6/18       LORazepam (LORazepam) 1 Mg Tablet      1 MG PO BID, TAB         Reported         6/6/18       Fenofibrate (Fenofibrate*) 48 Mg Tablet      48 MG PO QDAY, TAB         Reported         6/6/18      Medications Reviewed:  No Changes made to meds            IMPRESSION/PLAN      Impression            60 year old  gentleman who is being followed for DLBCL - s/p R-CHOP.     Patient is asymptomatic today and there are no clionical or radiological signs     of disease recurrence.            Diagnosis      Malignant lymphoma, large B-cell, diffuse - C83.30            Notes      New Diagnostics      * CBC With Auto Diff, 6 Months         Dx: Malignant lymphoma, large B-cell, diffuse - C83.30      * LDH, 6 Months         Dx: Malignant lymphoma, large B-cell, diffuse - C83.30      * Comp Metabolic Panel, 6 Months         Dx: Malignant lymphoma, large B-cell, diffuse - C83.30      * Abd/Pel W/ Cont CT, SCHEDULED PROCEDURE         Dx: Malignant lymphoma, large B-cell, diffuse - C83.30      * Chest W/ Cont CT, SCHEDULED PROCEDURE         Dx: Malignant lymphoma, large B-cell, diffuse - C83.30      * Neck W/ Cont CT, 6 Months         Dx: Malignant lymphoma, large B-cell, diffuse - C83.30            Plan      - continue wto monitor with serial physical examination and imaging.      - CT of neck/chest/abd/pelvis ordered for 6 months.      - advised to continue to follow-up with PCP for management of medical     comorbidities.      - monitor CBC, CMP and LDH.            RTC in 6 months for examination,  imaging and lab review.            Patient Education      Patient Education Provided:  Yes            Electronically signed by Guadalupe Juárez  12/30/2020 15:25       Disclaimer: Converted document may not contain table formatting or lab diagrams. Please see [email or web address] for the authenticated document.

## 2021-05-28 NOTE — PROGRESS NOTES
Patient: GUERDA WESTON     Acct: [id number]     Report: #[id number]  UNIT #: [id number]     : 1960    Encounter Date:2019  PRIMARY CARE: WALLACE RODRIGUEZ  ***Signed***  --------------------------------------------------------------------------------------------------------------------  NURSE INTAKE      Visit Type      Established Patient Visit            Chief Complaint      B-CELL LYMPHOMA            Referring Provider/Copies To      Provider Not Found in Lookup:  WALLACE RODRIGUEZ      Primary Care Provider:  WALLACE RODRIGUEZ            History and Present Illness      Past Oncology Illness History      Mr. house is a very pleasant 58-year-old gentleman with diffuse large cell B-    cell lymphoma, stage IIIB diagnosed in 2017. Patient has been treated     now with 5 cycles of CHOP plus Rituxan with which he was able to achieve a co    mplete remission. His most recent PET scan is unremarkable for any residual     uptake. Unfortunately his course was been complicated by significant tissue     necrosis and ulceration of the right hand secondary to chemotherapy     infiltration, most likely related to anthracyclines. Patient continues to be     very concerned about dysfunction of his right hand, he is currently under the     care of the wound management at [facility] but is not satisfied with     his recovery.  As far as lymphoma is concerned he is in complete remission and     does not need any further intervention at this stage even though he has received    5 of the intended 6 cycles of chemoimmunotherapy.  Pt seeing Dr. Donovan, hand     surgeon at [facility] who is planning to do repair surgery and possibly gr    afting.            HPI - Oncology Interim      Patient returns today for follow-up of diffuse large B-cell lymphoma.  Status     post treatment as outlined.  On return today, he states that he is feeling well.     He denies any masses or lymphadenopathy.  He  denies any fever, chills, weight     loss, night sweats.  He reports excellent appetite.  His energy level is     adequate for his daily needs.  He denies excessive bruising or bleeding.  He     continues to have right hand issues from prior extravasation and has had several    surgeries.  His use of the hand is somewhat limited although slowly improving.            Cancer Details            Left groin DLBCL-positive BCL6/MCL/CEP8 and IGH            Clinical Staging      Stage IIIB            Treatments      Chemotherapy      4/5/18 completed 5 of 6 R-CHOP            Clinical Trial Participant      No            ECOG Performance Status      0            PAST, FAMILY   Past Medical History      Past Medical History:  Arthritis, Diabetes Type 2, High Cholesterol,     Hypertension, Sleep Apnea      Hematology/Oncology (M):  Lymphoma (b cell)            Past Surgical History      Biopsy (colon), Cholecystectomy, Fracture Repair (right hand x 2), Joint     Replacement (both knees), Spinal Surgery (neck)            Family History      Family History:  Lung Cancer (brother), Lymphoma (mother and brother), Melanoma     (brother)            Social History      Marital Status:        Lives independently:  Yes      Number of Children:  1      Occupation:  disabled            Tobacco Use      Tobacco status:  Current every day smoker      Smoking packs/day:  2            Alcohol Use      Alcohol intake:  None            Substance Use      Substance use:  Denies use            REVIEW OF SYSTEMS      General:  Admits: Fatigue      Eye:  Admits: Eye Pain      Respiratory:  Denies: Productive Cough      Gastrointestinal:  Denies: Diarrhea      Genitourinary (male):  Denies: Frequent Urination      Musculoskeletal:  Admits: Back Pain      Integumentary:  Denies: Jaundice      Neurologic:  Denies: Dizziness      Endocrine:  Admits: Diabetes            VITAL SIGNS AND SCORES      Vitals      Height 5 ft 9.29 in / 176 cm       Weight 277 lbs 1.892 oz / 125.7 kg      BSA 2.38 m2      BMI 40.6 kg/m2      Temperature 97.8 F / 36.56 C - Temporal      Pulse 65      Respirations 20      Blood Pressure 150/67 Sitting, Right Arm      Pulse Oximetry 97%, ROOM AIR            Pain Score      Pain Scale Used:  Numerical      Pain Intensity:  8            Fatigue Score      Fatigue (0-10 scale):  7            EXAM      General Appearance:  Positive for: Alert, Oriented x3, Cooperative;          Negative for: Acute Distress      Eye:  Positive for: Anicteric Sclerae, Moist Conjunctiva      HEENT:  Positive for: Oropharynx clear      Other      No tonsillar hypertrophy      Neck:  Positive for: Supple;          Negative for: JVD, Masses      Respiratory:  Positive for: CTAB, Normal Respiratory Effort      Abdomen/Gastro:  Positive for: Normal Active Bowel Sounds, Soft;          Negative for: Distention, Hepatosplenomegaly, Tenderness      Cardiovascular:  Positive for: RRR;          Negative for: Gallop, Murmur, Peripheral Edema, Rub      Psychiatric:  Positive for: Appropriate Affect, Intact Judgement      Lymphatic:  Negative for: Axillary, Cervical, Epitrochlear, Infraclavicular,     Occipital, Posterior auricular, Preauricular, Supraclavicular            PREVENTION      Hx Influenza Vaccination:  Yes      Date Influenza Vaccine Given:  Nov 1, 2018      Influenza Vaccine Declined:  Yes      2 or More Falls Past Year?:  No      Fall Past Year with Injury?:  No      Hx Pneumococcal Vaccination:  Yes      Encouraged to follow-up with:  PCP regarding preventative exams.      Chart initiated by      JJ GARZA            ALLERGY/MEDS      Allergies      Coded Allergies:             NO KNOWN DRUG ALLERGIES (Verified  Allergy, Unknown, 2/21/19)            Medications      Last Reconciled on 2/21/19 08:27 by CHELSEA SEARS      Hydroxychloroquine Sulfate (Plaquenil*) 200 Mg Tab      400 MG PO BID, TAB         Reported         7/2/18        Metformin Hcl (Metformin Hcl) 500 Mg Tablet      500 MG PO BID, #60 TAB 0 Refills         Reported         6/6/18       Omeprazole (PriLOSEC*) 40 Mg Capsule.dr      40 MG PO HS for 30 Days, #30 CAP 0 Refills         Reported         6/6/18       Citalopram HBr (CeleXA) 40 Mg Tablet      40 MG PO HS, #30 TAB 0 Refills         Reported         6/6/18       Carbamazepine (TEGretol-XR*) 200 Mg Tab.er.12h      200 MG PO BID, #60 TAB.SR 0 Refills         Reported         6/6/18       Promethazine HCl (Phenergan*) 25 Mg Tablet      25 MG PO BID, #60 TAB 0 Refills         Reported         6/6/18       Hydrocodone/Acetaminophen 10/325 MG (Hydrocodone/Acetaminophen 10/325 MG) 1 Each    Tablet      1 TAB PO Q4H PRN for BREAKTHROUGH PAIN, TAB 0 Refills         Reported         6/6/18       Hctz/Spironolactone 25/25 Mg (ALDACTAZIDE 25/25) 1 Each Tablet      1 TAB PO QDAY, #30 TAB         Reported         6/6/18       LORazepam (LORazepam) 1 Mg Tablet      1 MG PO BID, TAB         Reported         6/6/18       Fenofibrate (Fenofibrate*) 48 Mg Tablet      48 MG PO QDAY, TAB         Reported         6/6/18      Medications Reviewed:  No Changes made to meds            IMPRESSION/PLAN      Impression      Diffuse large B-cell lymphoma            Diagnosis      Malignant lymphoma, large B-cell, diffuse         Diffuse large B-cell lymphoma, unspecified body region - C83.30         Lymphoma site: unspecified region            Notes      Status post treatment as above.  Patient is doing well.  I see no evidence of     disease recurrence by his history, physical examination. lab work will be     obtained today.  I will see him back in 3 months time for ongoing surveillance     with repeat labs and scans prior.      New Diagnostics      * CBC With Auto Diff, Routine         Dx: Malignant lymphoma, large B-cell, diffuse - C83.30      * CMP Comp Metabolic Panel, Routine         Dx: Malignant lymphoma, large B-cell, diffuse - C83.30       * LDH, Routine         Dx: Malignant lymphoma, large B-cell, diffuse - C83.30      * CBC With Auto Diff, 3 Months         Dx: Malignant lymphoma, large B-cell, diffuse - C83.30      * CMP Comp Metabolic Panel, 3 Months         Dx: Malignant lymphoma, large B-cell, diffuse - C83.30      * LDH, 3 Months         Dx: Malignant lymphoma, large B-cell, diffuse - C83.30      * CT Abd/Pelvis/Chest W/Contrast, 3 Months         Dx: Malignant lymphoma, large B-cell, diffuse - C83.30            Patient Education      Patient Education Provided:  Yes                 Disclaimer: Converted document may not contain table formatting or lab diagrams. Please see [employer] [facility] for the authenticated document.

## 2021-05-28 NOTE — PROGRESS NOTES
Patient: GUERDA WESTON     Acct: [id number]     Report: #[id number]  UNIT #: [id number]     : 1960    Encounter Date:2019  PRIMARY CARE: WALLACE RODRIGUEZ  ***Signed***  --------------------------------------------------------------------------------------------------------------------  NURSE INTAKE      Visit Type      Established Patient Visit            Chief Complaint      DBLCL      Intent of Therapy:  Curative            Referring Provider/Copies To      Provider Not Found in Lookup:  WALLACE RODRIGUEZ      Primary Care Provider:  WALLACE RODRIGUEZ            History and Present Illness      Past Oncology Illness History      Mr. house is a very pleasant 58-year-old gentleman with diffuse large cell B-    cell lymphoma, stage IIIB diagnosed in 2017. Patient has been treated     now with 5 cycles of CHOP plus Rituxan with which he was able to achieve a     complete remission. His most recent PET scan is unremarkable for any residual     uptake. Unfortunately his course was been complicated by significant tissue     necrosis and ulceration of the right hand secondary to chemotherapy     infiltration, most likely related to anthracyclines. Patient continues to be     very concerned about dysfunction of his right hand, he is currently under the     care of the wound management at [facility] but is not satisfied with     his recovery.  As far as lymphoma is concerned he is in complete remission and     does not need any further intervention at this stage even though he has received    5 of the intended 6 cycles of chemoimmunotherapy.  Pt seeing Dr. Donovan, hand     surgeon at [facility] who is planning to do repair surgery and possibly     grafting.            HPI - Oncology Interim      Pt returns to clinic for f/u NHL--3mo f/u--Lab work 19 showed improved CBC,    stable cmp-creat sl in 1.26,  and glucose 140.  He was recently inhouse     to have surgery on  right ring finger; however, significant hyperglycemia and     blood pressure caused him to cancel the surgery.  He is wearing a splint on his     right ring finger. He reports significant fatigue; however, is not exercising.      He denies fever, chills, weight loss, night sweats or lymphadenopathy.  He     reports good appetite and his weight is maintained.  He denies excessive br    uising or bleeding.            Cancer Details            Left groin DLBCL-positive BCL6/MCL/CEP8 and IGH            Clinical Staging      Stage IIIB            Treatments      Chemotherapy      4/5/18 completed 5 of 6 R-CHOP            Clinical Trial Participant      No            ECOG Performance Status      0            Most Recent Imaging Findings      1/23/19            PROCEDURE:   CT ABDOMEN; CT CHEST; CT PELVIS WITH CONTRAST             COMPARISON:   [facility], CT, CT CHEST ABD PEL W CONTRAST,     10/18/2018, 12:50.             INDICATIONS:   B CELL LYMPHOMA FOLLOWUP             TECHNIQUE:   After obtaining the patient's consent, CT images were obtained with    intravenous contrast       material.      PROTOCOL:     Standard imaging protocol performed                RADIATION:     DLP: 3634mGy*cm          Automated exposure control was utilized to minimize radiation dose.       CONTRAST:   100cc Isovue 370 I.V.      NOTES:     THE PATIENT WAS ADVISED TO REFRAIN FROM THE USE OF METFORMIN-    CONTAINING MEDICATION FOR 48       HOURS.  RECOMMEND THAT ORDERING PHYSICIAN SCHEDULE FOLLOW UP BLOODWORK FOR     CLEARANCE TO RESUME       METFORMIN-CONTAINING MEDICATIONS.               FINDINGS:         CHEST:      Visualized lower neck without acute abnormality.  Heart size normal.  Three-    vessel coronary       atherosclerotic calcifications present.  No pericardial effusion.  No pulmonary     embolus. The       thoracic aorta and aortic arch branch vessels are patent.  No axillary     adenopathy.  Scattered        mediastinal lymph nodes below size criteria.  Right hilar node measures 9 mm on     image 42, stable.        No left hilar adenopathy.  Trachea and mainstem bronchi are patent.  No     consolidation,       pneumothorax, or pleural effusion. Within the right upper lobe there is a new 3     mm nodule on image       17. There is a 4 mm subpleural nodule in the right upper lobe on image 28 which     is stable.        Previously seen scattered left lower lobe ground-glass nodules have resolved.      Bony thorax intact.        No acute fracture.             ABDOMEN AND PELVIS:      The liver and spleen are normal in size and contour.  Hepatic steatosis.      Adrenal glands are       normal.  The pancreas is without evidence of pancreatitis.  There is accessory     splenic tissue in       the pancreatic tail on image 38 measuring 12 mm, stable.  Gallbladder absent.      Post cholecystectomy       dilatation of the common bile duct.  Kidneys enhance symmetrically.  There is a     large cyst at the       upper pole of the left kidney measuring 7.1 cm.  Small exophytic lesion at the     lower pole right       kidney likely cyst measuring 14 mm.  Additional small exophytic cyst at the     lower pole of the left       kidney.  No obstructing renal or ureteral calculi.  Urinary bladder is thin-    walled normal in size.             Negative for pneumoperitoneum.  No findings of bowel obstruction.  No free fluid    in the abdomen or       pelvis.  Normal appendix.  The portal vein, splenic vein and superior mesenteric    vein are patent.        Abdominal aorta and branch vasculature patent with mild to moderate     atherosclerotic calcified       plaque.  No acute fracture.  Disc disease noted at L4-5 and L5-S1.  No     adenopathy in the abdomen or       pelvis by size criteria.  Sigmoid diverticulosis.             CONCLUSION:         1. No adenopathy in the chest, abdomen, or pelvis.      2. Nonspecific 3 mm right upper lobe  pulmonary nodule, recommend attention on     followup.      3. Coronary atherosclerotic disease.      4. Hepatic steatosis.            PAST, FAMILY   Past Medical History      Past Medical History:  Arthritis, Diabetes Type 2, High Cholesterol,     Hypertension, Sleep Apnea      Hematology/Oncology (M):  Lymphoma (b cell)            Past Surgical History      Biopsy (colon), Cholecystectomy, Fracture Repair (right hand x 2), Joint     Replacement (both knees), Spinal Surgery (neck)            Family History      Family History:  Lung Cancer (brother), Lymphoma (mother and brother), Melanoma     (brother)            Social History      Marital Status:        Lives independently:  Yes      Number of Children:  1      Occupation:  disabled            Tobacco Use      Tobacco status:  Current every day smoker      Smoking packs/day:  2            Alcohol Use      Alcohol intake:  None            Substance Use      Substance use:  Denies use            REVIEW OF SYSTEMS      General:  Denies: Appetite Change, Fatigue, Fever, Night Sweats, Weight Gain,     Weight Loss      Eye:  Denies: Blurred Vision, Corrective Lenses, Diplopia, Eye Irritation, Eye     Pain, Eye Redness, Spots in Vision, Vision Loss      ENT:  Denies: Headache, Hearing Loss, Hoarseness, Seizures, Sinus Congestion,     Sore Throat      Cardiovascular:  Denies: Chest Pain, Edema Ankles, Edema Legs, Irregular     Heartbeat, Palpitations      Respiratory:  Denies: Coughing Blood, Productive Cough, Shortness of Air,     Wheezing      Gastrointestinal:  Denies: Bloody Stools, Constipation, Diarrhea, Frequent     Heartburn, Nausea, Problem Swallowing, Tarry Stools, Unable to Control Bowels,     Vomiting      Genitourinary (male):  Denies: Blood in Urine, Decrease Urine Stream, Frequent     Urination, Incontinence, Painful Urination      Musculoskeletal:  Denies: Back Pain, Leg Cramps, Muscle Pain, Muscle Weakness,     Painful Joints, Swollen Joints       Integumentary:  Denies: Bleeds Easily, Bruises Easily, Hair Changes, Jaundice,     Lesions, Mole Changes, Nail Changes, Pigment Changes, Rash, Skin Discoloration      Neurologic:  Denies: Dizziness, Fainting, Numbness\Tingling, Paralysis, Seizures      Psychiatric:  Denies: Anxiety, Confused, Depression, Disoriented, Memory Loss      Endocrine:  Admits: Diabetes;          Denies: Cold Intolerance, Excessive Sweating, Excessive Thirst, Excessive     Urination, Heat Intolerance, Flushing, Hyperthyroidism, Hypothyroidism      Hematologic/Lymphatic:  Denies: Bruising, Bleeding, Enlarged Lymph Nodes,     Recurrent Infections, Transfusions            VITAL SIGNS AND SCORES      Vitals      Height 5 ft 9.29 in / 176 cm      Weight 287 lbs 0.623 oz / 130.2 kg      BSA 2.42 m2      BMI 42.0 kg/m2      Temperature 99.1 F / 37.28 C - Temporal      Pulse 73      Respirations 18      Blood Pressure 148/74 Sitting, Left Arm      Pulse Oximetry 99%, rm air            Pain Score      Pain Scale Used:  Numerical      Pain Intensity:  0            Fatigue Score      Fatigue (0-10 scale):  8            EXAM      General Appearance:  Positive for: Alert, Oriented x3, Cooperative, Obese;          Negative for: Acute Distress      HEENT:  Negative for: Oropharynx clear      Other      No tonsillar hypertrophy      Neck:  Positive for: Supple;          Negative for: JVD, Masses      Respiratory:  Positive for: CTAB, Normal Respiratory Effort      Abdomen/Gastro:  Positive for: Normal Active Bowel Sounds, Soft;          Negative for: Distention, Hepatosplenomegaly, Tenderness      Cardiovascular:  Positive for: RRR;          Negative for: Gallop, Murmur, Peripheral Edema, Rub      Psychiatric:  Positive for: Appropriate Affect, Intact Judgement      Lymphatic:  Negative for: Axillary, Cervical, Epitrochlear, Infraclavicular,     Occipital, Posterior auricular, Preauricular, Supraclavicular            PREVENTION      Hx Influenza  Vaccination:  Yes      Date Influenza Vaccine Given:  Nov 1, 2018      Influenza Vaccine Declined:  Yes      2 or More Falls Past Year?:  No      Fall Past Year with Injury?:  No      Hx Pneumococcal Vaccination:  Yes      Encouraged to follow-up with:  PCP regarding preventative exams.      Chart initiated by      bambi sainz ma            ALLERGY/MEDS      Allergies      Coded Allergies:             NO KNOWN DRUG ALLERGIES (Verified  Allergy, Unknown, 2/21/19)            Medications      Last Reconciled on 2/21/19 08:27 by CHELSEA SEARS      Hydroxychloroquine Sulfate (Plaquenil*) 200 Mg Tab      400 MG PO BID, TAB         Reported         7/2/18       Metformin Hcl (Metformin Hcl) 500 Mg Tablet      500 MG PO BID, #60 TAB 0 Refills         Reported         6/6/18       Omeprazole (PriLOSEC*) 40 Mg Capsule.dr      40 MG PO HS for 30 Days, #30 CAP 0 Refills         Reported         6/6/18       Citalopram HBr (CeleXA) 40 Mg Tablet      40 MG PO HS, #30 TAB 0 Refills         Reported         6/6/18       Carbamazepine (TEGretol-XR*) 200 Mg Tab.er.12h      200 MG PO BID, #60 TAB.SR 0 Refills         Reported         6/6/18       Promethazine HCl (Phenergan*) 25 Mg Tablet      25 MG PO BID, #60 TAB 0 Refills         Reported         6/6/18       Hydrocodone/Acetaminophen 10/325 MG (Hydrocodone/Acetaminophen 10/325 MG) 1 Each    Tablet      1 TAB PO Q4H PRN for BREAKTHROUGH PAIN, TAB 0 Refills         Reported         6/6/18       Hctz/Spironolactone 25/25 Mg (ALDACTAZIDE 25/25) 1 Each Tablet      1 TAB PO QDAY, #30 TAB         Reported         6/6/18       LORazepam (LORazepam) 1 Mg Tablet      1 MG PO BID, TAB         Reported         6/6/18       Fenofibrate (Fenofibrate*) 48 Mg Tablet      48 MG PO QDAY, TAB         Reported         6/6/18      Medications Reviewed:  No Changes made to meds            IMPRESSION/PLAN      Impression      Diffuse large B-cell lymphoma.            Diagnosis      Malignant  lymphoma, large B-cell, diffuse         Diffuse large B-cell lymphoma of lymph nodes of inguinal region - C83.35         Lymphoma site: inguinal      Patient is doing well.  I see no evidence of disease recurrence by his history,     physical examination, lab work or recent scans.  RTC 3 months for OV,     examination and labs.  I will plan repeat scans in 6 months.      New Diagnostics      * LDH, 3 Months      * CMP Comp Metabolic Panel, 3 Months      * CBC With Auto Diff, 3 Months            Patient Education            Exercise (Alternative Therapy)      Patient Education Provided:  Yes                 Disclaimer: Converted document may not contain table formatting or lab diagrams. Please see [employer] [facility] for the authenticated document.

## 2021-05-28 NOTE — PROGRESS NOTES
Patient: GUERDA WESTON     Acct: [id number]     Report: #[id number]  UNIT #: [id number]     : 1960    Encounter Date:2020  PRIMARY CARE: WALLACE RODRIGUEZ  ***Signed***  --------------------------------------------------------------------------------------------------------------------  TELEHEALTH NOTE      History of Present Illness            Chief Complaint: (Lymphoma)            Guerda Weston is presenting for evaluation via Telehealth visit by phone. Verbal     consent obtained before beginning visit.            Provider spent (12) minutes with the patient during telehealth visit.            The following staff were present during the visit: (None)                         Past Med History      Diffuse large B-cell lymphoma status post 5 cycles of R-CHOP chemotherapy with     complete response      Overview of Symptoms      Patient returns today for follow-up of his lymphoma.  He is currently on     observation.  He states he has been doing well since his last visit.  He denies     any swollen or painful lymphadenopathy.  He denies fever, chills, weight loss,     night sweats.  His energy level is low but adequate for his daily needs.  He     reports he does not exercise.  He denies excessive bruising or bleeding.             Patient: GUERDA WESTON   Acct: #[id number]   Report: #[id number]            UNIT #: [id number]    DOS: 20 1420      INSURANCE:[employer] O   ORDER #:[id number]      LOCATION:CT     : 1960            PROVIDERS      ADMITTING:     ATTENDING: FLACA FUNES      FAMILY:  WALLACE RODRIGUEZ   ORDERING:  FLACA FUNES         OTHER:    DICTATING:  Christopher Kraft MD            REQ #:[id number]   EXAM:[facility] - CT ABD CHEST PEL w CONTR      REASON FOR EXAM:  LARGE B CELL LYMPHOMA      REASON FOR VISIT:  LARGE B CELL LYMPHOMA            *******Signed******         PROCEDURE:   CT ABDOMEN; CT CHEST; CT PELVIS WITH CONTRAST              COMPARISON:   [facility], CT, CT CHEST ABD PEL W CONTRAST,     12/09/2019, 14:52.             INDICATIONS:   LARGE B CELL LYMPHOMA             TECHNIQUE:   After obtaining the patient's consent, CT images were obtained with     intravenous contrast       material.             FINDINGS:         Chest:  There is no mediastinal, hilar, or axillary adenopathy.  There are no     pleural effusions.        Previously demonstrated 5 mm ground-glass nodule within the right upper lobe has     completely       resolved.  There is a stable subpleural 2 mm nodule within the right lung apex     no other       noncalcified pulmonary nodule identified.  No focal airspace consolidation is     seen.  There is a       large amount of coronary artery calcification.  Heart size is within normal     limits.             Abdomen:  There is diffuse fatty infiltration of the liver.  Patient is status     post       cholecystectomy.  No evidence of biliary tract obstruction.  Pancreas and spleen     are within normal       limits.  Bilateral adrenal glands are within normal limits.  There are low-    density masses of both       kidneys compatible with cyst unchanged from prior study.  No suspicious contrast     enhancing renal       mass lesion identified.  There is no abdominal or retroperitoneal adenopathy.      The upper GI tract       is within normal limits.             Pelvis:  There is no pelvic or inguinal adenopathy.  No free intraperitoneal flu    id is seen.  There       are multiple colonic diverticula but no evidence of diverticulitis.  The appendi    x is normal.  No       free intraperitoneal fluid identified.             Bones:  There are degenerative changes throughout the spine.  No lytic or     sclerotic bony lesions       are identified.             CONCLUSION:         1. Stable exam with no adenopathy seen within the chest, abdomen, or pelvis.      2. Interval resolution of 5 mm ground-glass nodule within  the right upper lobe.      There is a stable       2 mm noncalcified nodule within the right lung apex.  Repeat noncontrast CT scan     of the chest would       be recommended in 12 months.      3. Hepatic steatosis      4. Stable bilateral renal cyst      5. Colonic diverticulosis              KRAIG BRENNAN MD             Electronically Signed and Approved By: KRAIG BRENNAN MD on 6/29/2020 at 15:52                            Until signed, this is an unconfirmed preliminary report that may contain      errors and is subject to change.                                              STEBA:      D:06/29/20 1552            Most Recent Lab Findings      Laboratory Tests      6/29/20 14:31            Allergies/Medications      Allergies:        Coded Allergies:             NO KNOWN DRUG ALLERGIES (Verified  Allergy, Unknown, 12/11/19)      Medications    Last Reconciled on 7/1/20 12:30 by FLACA FUNES      Lisinopril/HCTZ (Zestoretic 10/12.5 MG*) 1 Tab Tablet      1 TAB PO QDAY for 30 Days, #30 TAB         Reported         10/3/19       Hydroxychloroquine Sulfate (Plaquenil) 200 Mg Tab      400 MG PO BID, TAB         Reported         7/2/18       metFORMIN HCl (metFORMIN HCl) 500 Mg Tablet      500 MG PO BID, #60 TAB 0 Refills         Reported         6/6/18       Omeprazole (priLOSEC) 40 Mg Capsule.dr      40 MG PO HS for 30 Days, #30 CAP 0 Refills         Reported         6/6/18       Citalopram HBr (CeleXA) 40 Mg Tablet      40 MG PO HS, #30 TAB 0 Refills         Reported         6/6/18       carBAMazepine (TEGretol-XR) 200 Mg Tab.er.12h      200 MG PO BID, #60 TAB.SR 0 Refills         Reported         6/6/18       Promethazine HCl (Phenergan*) 25 Mg Tablet      25 MG PO BID, #60 TAB 0 Refills         Reported         6/6/18       HYDROcodone-Acetaminophen  Mg (HYDROcodone-Acetaminophen  Mg) 1 Each     Tablet      1 TAB PO Q4H PRN for BREAKTHROUGH PAIN, TAB 0 Refills         Reported         6/6/18        LORazepam (LORazepam) 1 Mg Tablet      1 MG PO BID, TAB         Reported         6/6/18       Fenofibrate (Fenofibrate*) 48 Mg Tablet      48 MG PO QDAY, TAB         Reported         6/6/18            Plan/Instructions      Ambulatory Assessment/Plan:        Malignant lymphoma, large B-cell, diffuse         Diffuse large B-cell lymphoma, unspecified body region         Lymphoma site: unspecified region            Notes      Patient is doing well.  I see no evidence of disease recurrence by history,     physical examination, lab work or recent scans.  He demonstrates mild cytopenias     which will be monitored.  Blood sugar is elevated but he has been working with     his primary care provider on his sugar control.  CT scan demonstrates once a     very small 2 mm nodule which will be followed I will see him back in 6 months     for that purpose with labs and scans prior      New Diagnostics      * CT Abd/Pelvis/Chest W/Contrast, 6 Months         Dx: Malignant lymphoma, large B-cell, diffuse - C83.30      * CBC With Auto Diff, 6 Months         Dx: Malignant lymphoma, large B-cell, diffuse - C83.30      * CMP Comp Metabolic Panel, 6 Months         Dx: Malignant lymphoma, large B-cell, diffuse - C83.30      * LDH, 6 Months         Dx: Malignant lymphoma, large B-cell, diffuse - C83.30      Plan/Instructions            * Plan Of Care: ()            * Chronic conditions reviewed and taken into consideration for today's treatment       plan.      * Patient instructed to seek medical attention urgently for new or worsening       symptoms.      * Patient was educated/instructed on their diagnosis, treatment and medications       prior to discharge from the clinic today.      Codes:  Phone Eval [id number] mi [id number]            Electronically signed by FLACA FUNES  07/01/2020 12:30       Disclaimer: Converted document may not contain table formatting or lab diagrams. Please see [employer] [facility] for the  authenticated document.

## 2021-05-28 NOTE — PROGRESS NOTES
"Patient: GUERDA WESTON     Acct: [id number]     Report: #[id number]  UNIT #: [id number]     : 1960    Encounter Date:2021  PRIMARY CARE: WALLACE RODRIGUEZ  ***Signed***  --------------------------------------------------------------------------------------------------------------------  NURSE INTAKE      Visit Type      Established Patient Visit            Chief Complaint      dlbcl/knot      Intent of Therapy:  Curative            Referring Provider/Copies To      Primary Care Provider:  WALLACE RODRIGUEZ      Copies To:   WALLACE RODRIGUEZ            History and Present Illness      Past Oncology Illness History      Mr. House is a very pleasant 60-year-old gentleman with diffuse large cell B-    cell lymphoma, stage IIIB diagnosed in 2017. Patient has been treated     now with 5 cycles of CHOP plus Rituxan with which he was able to achieve a     complete remission. His most recent PET scan is unremarkable for any residual     uptake. Unfortunately his course was been complicated by significant tissue     necrosis and ulceration of the right hand secondary to chemotherapy     infiltration, most likely related to anthracyclines. Patient continued to be     very concerned about dysfunction of his right hand, he is currently under the     care of the wound management at [facility] but is not satisfied with     his recovery.  As far as lymphoma is concerned he is in complete remission and     does not need any further intervention at this stage even though he has received    5 of the intended 6 cycles of chemoimmunotherapy.  Pt seeing Dr. Donovan, hand     surgeon at [facility] who is planning to do repair surgery and possibly     grafting. 3/31/21 patient presents to the clinic for a new lump he noted behind     his right ear about 1 month ago.            HPI - Oncology Interim      Patient presents for an unscheduled visit with complaints of a \"knot\" behind his    right earlobe.  " He noticed this a couple of weeks ago.  The area is slightly     swollen but not painful.  He is status post treatments as outlined having     completed R-CHOP chemotherapy 4/18.  He denies fever, chills, weight loss, night    sweats.  No other adenopathy or new masses.  He reports adequate energy level.      He denies excessive bruising or bleeding.            Cancer Details            Left groin DLBCL-positive BCL6/MCL/CEP8 and IGH            Clinical Staging      Left groin DLBCL-positive BCL6/MCL/CEP8 and IGH            Stage IIIB            Treatments      Chemotherapy      4/5/18 completed 5 of 6 R-CHOP            Clinical Trial Participant      No            ECOG Performance Status      0            PAST, FAMILY   Past Medical History      Past Medical History:  Arthritis, Diabetes Type 2, High Cholesterol,     Hypertension, Sleep Apnea      Hematology/Oncology (M):  Lymphoma            Past Surgical History      Biopsy, Cholecystectomy, Fracture Repair, Joint Replacement, Spinal Surgery            Family History      Family History:  Lung Cancer, Lymphoma, Melanoma            Social History      Lives independently:  Yes      Number of Children:  1      Occupation:  disabled            Tobacco Use      Tobacco status:  Current every day smoker      Smoking packs/day:  2            Substance Use      Substance use:  Denies use            REVIEW OF SYSTEMS      General:  Admits: Fatigue      ENT:  Denies Headache      Respiratory:  Admits: Shortness of Air;          Denies: Productive Cough      Gastrointestinal:  Denies Diarrhea, Denies Nausea/Vomiting      Musculoskeletal:  Admits Muscle Pain, Admits Painful Joints      Neurologic:  Admits Dizziness, Admits Numbness\Tingling (hands)            VITAL SIGNS AND SCORES      Vitals      Weight 288 lbs 12.842 oz / 131 kg      Temperature 98.9 F / 37.17 C - Temporal      Pulse 93      Respirations 28      Blood Pressure 165/76 Sitting, Right Arm      Pulse  Oximetry 95%, rm air            Pain Score      Pain Scale Used:  Numerical      Pain Intensity:  7            Fatigue Score      Fatigue (0-10 scale):  9            EXAM      General Appearance:  Positive for: Alert, Cooperative, Acute Distress, Obese      Eye:  Positive for: Anicteric Sclerae, Moist Conjunctiva      Other      Small subcutaneous cyst behind the right pinna of the ear.  No erythema or     induration      Neck:  Positive for: Supple;          Negative for: JVD, Masses      Cardiovascular:  Positive for: RRR;          Negative for: Gallop, Murmur, Peripheral Edema, Rub      Psychiatric:  Positive for: Appropriate Affect, Intact Judgement      Lymphatic:  Negative for: Axillary, Cervical, Epitrochlear, Infraclavicular,     Posterior auricular, Preauricular, Supraclavicular            PREVENTION      Hx Influenza Vaccination:  Yes      Date Influenza Vaccine Given:  Nov 1, 2020      Influenza Vaccine Declined:  No      2 or More Falls in Past Year?:  No      Fall Past Year with Injury?:  No      Hx Pneumococcal Vaccination:  Yes      Encouraged to follow-up with:  PCP regarding preventative exams.      Chart initiated by      bambi sainz ma            ALLERGY/MEDS      Allergies      Coded Allergies:             NO KNOWN DRUG ALLERGIES (Verified  Allergy, Unknown, 3/31/21)            Medications      Last Reconciled on 3/31/21 15:26 by FLACA FUNES      Lisinopril/HCTZ (Zestoretic 10/12.5 MG*) 1 Tab Tablet      1 TAB PO QDAY for 30 Days, #30 TAB         Reported         10/3/19       Hydroxychloroquine Sulfate (Plaquenil) 200 Mg Tab      400 MG PO BID, TAB         Reported         7/2/18       metFORMIN HCl (metFORMIN HCl) 500 Mg Tablet      500 MG PO BID, #60 TAB 0 Refills         Reported         6/6/18       Omeprazole (priLOSEC) 40 Mg Capsule.dr      40 MG PO HS for 30 Days, #30 CAP 0 Refills         Reported         6/6/18       Citalopram HBr (CeleXA) 40 Mg Tablet      40 MG PO HS, #30 TAB  0 Refills         Reported         6/6/18       carBAMazepine (TEGretol-XR) 200 Mg Tab.er.12h      200 MG PO BID, #60 TAB.SR 0 Refills         Reported         6/6/18       Promethazine HCl (Phenergan*) 25 Mg Tablet      25 MG PO BID, #60 TAB 0 Refills         Reported         6/6/18       HYDROcodone-Acetaminophen  Mg (HYDROcodone-Acetaminophen  Mg) 1 Each    Tablet      1 TAB PO Q4H PRN for BREAKTHROUGH PAIN, TAB 0 Refills         Reported         6/6/18       LORazepam (LORazepam) 1 Mg Tablet      1 MG PO BID, TAB         Reported         6/6/18       Fenofibrate (Fenofibrate*) 48 Mg Tablet      48 MG PO QDAY, TAB         Reported         6/6/18      Medications Reviewed:  No Changes made to meds            IMPRESSION/PLAN      Diagnosis      Malignant lymphoma, large B-cell, diffuse         Diffuse large B-cell lymphoma, unspecified body region         Lymphoma site: unspecified region      Status post R-CHOP chemotherapy in 2018.  The area in question behind the right     earlobe appears to be a subcutaneous cyst.  I reassured him that this is not     likely related to his prior diagnosis of lymphoma.  I detect no other adenopathy    on his examination.  Continue surveillance with follow-up visit and lab work in     June as previously scheduled.            Patient Education      Patient Education Provided:  Yes            Electronically signed by FLACA FUNES  03/31/2021 15:26       Disclaimer: Converted document may not contain table formatting or lab diagrams. Please see [employer] for the authenticated document.

## 2021-05-28 NOTE — PROGRESS NOTES
Patient: GUERDA WESTON     Acct: [id number]     Report: #[id number]  UNIT #: [id number]     : 1960    Encounter Date:10/24/2018  PRIMARY CARE: WALLACE RODRIGUEZ  ***Signed***  --------------------------------------------------------------------------------------------------------------------  Visit Type      Established Patient Visit            Chief Complaint      b cell lymphoma      Intent of Therapy:  Curative            Referring Provider/Copies To      Provider Not Found in Lookup:  WALLACE RODRIGUEZ            Allergies      Coded Allergies:             NO KNOWN DRUG ALLERGIES (Verified  Allergy, Unknown, 10/24/18)            Medications      Last Reconciled on 10/24/18 14:52 by CHELSEA SEARS      Fentanyl (Fentanyl-100*) 1 Each Patch.td72      100 MCG TRANSDERM Q72H@13, PATCH         Reported         18       Hydroxychloroquine Sulfate (Plaquenil*) 200 Mg Tab      400 MG PO BID, TAB         Reported         18       Metformin Hcl* (Metformin Hcl*) 500 Mg Tablet      500 MG PO BID, #60 TAB 0 Refills         Reported         18       Omeprazole (PriLOSEC*) 40 Mg Capsule.dr      40 MG PO HS for 30 Days, #30 CAP 0 Refills         Reported         18       Citalopram HBr (CeleXA) 40 Mg Tablet      40 MG PO HS, #30 TAB 0 Refills         Reported         18       Carbamazepine (TEGretol-XR*) 200 Mg Tab.er.12h      200 MG PO BID, #60 TAB.SR 0 Refills         Reported         18       Promethazine HCl (Phenergan*) 25 Mg Tablet      25 MG PO BID, #60 TAB 0 Refills         Reported         18       Hydrocodone/Acetaminophen 10/325 MG (Hydrocodone/Acetaminophen 10/325 MG) 1 Each    Tablet      1 TAB PO Q4H PRN for BREAKTHROUGH PAIN, TAB 0 Refills         Reported         18       Hctz/Spironolactone 25/25 Mg (ALDACTAZIDE 25/25) 1 Each Tablet      1 TAB PO QDAY, #30 TAB         Reported         18       LORazepam (LORazepam) 1 Mg Tablet      1 MG PO BID, TAB          Reported         6/6/18       Fenofibrate (Fenofibrate*) 48 Mg Tablet      48 MG PO QDAY, TAB         Reported         6/6/18      Medications Reviewed:  No Changes made to meds            History and Present Illness      Past Oncology Illness History      Mr. house is a very pleasant 58-year-old gentleman with diffuse large cell B-    cell lymphoma, stage IIIB diagnosed in November 2017. Patient has been treated     now with 5 cycles of CHOP plus Rituxan with which he was able to achieve a     complete remission. His most recent PET scan is unremarkable for any residual     uptake. Unfortunately his course was been complicated by significant tissue     necrosis and ulceration of the right hand secondary to chemotherapy infiltr    ation, most likely related to anthracyclines. Patient continues to be very     concerned about dysfunction of his right hand, he is currently under the care of    the wound management at [facility] but is not satisfied with his     recovery.  As far as lymphoma is concerned he is in complete remission and does     not need any further intervention at this stage even though he has received 5 of    the intended 6 cycles of chemoimmunotherapy.  Pt seeing Dr. Donovan, hand surgeon     at [facility] who is planning to do repair surgery and possibly grafting.            HPI - Oncology Interim      Patient returns today for follow-up of his diffuse B-cell lymphoma. He completed    5 of 6 planned R CHOP chemotherapies with complete response. He has been having     problems with chemotherapy extravasation on the right hand requiring extensive     surgical intervention including debridement, skin grafting etc. He states that     he is about to start physical therapy for his hand. He had 1 lymph node that was    tender in the right groin a few days ago but it is already improving. He denies     any other swollen or painful lymphadenopathy. He reports adequate energy level.     His  appetite is normal and his weight is maintained. He denies fever, chills,     weight loss, night sweats. He denies excessive bruising or bleeding. He had lab     work yesterday at his primary care provider's office. This will be requested            Clinical Staging      Stage IIIB            Clinical Trial Participant      No            ECOG Performance Status      0            Most Recent Imaging Findings      Patient: GUERDA WESTON   Acct: #[id number]   Report: #[id number]            UNIT #: [id number]    DOS: 10/18/18 1236      INSURANCE:[employer] [facility]   ORDER #:[id number]      LOCATION:CT     : 1960            PROVIDERS      ADMITTING:     ATTENDING: ORAL GARCIA      FAMILY:  WALLACE RODRIGUEZ   ORDERING:  ORAL GARCIA         OTHER:    DICTATING:  Alexis Mar MD            REQ #:[id number]   EXAM:CTACPWC - CT ABD CHEST PEL w CONTR      REASON FOR EXAM:  LYMPHOMA B CELL      REASON FOR VISIT:  LYMPHOMA B CELL            *******Signed******         PROCEDURE:   CT ABDOMEN; CT CHEST; CT PELVIS WITH CONTRAST             COMPARISON:   None.             INDICATIONS:   LYMPHOMA B CELL             TECHNIQUE:   After obtaining the patient's consent, CT images were obtained with    intravenous contrast       material.      PROTOCOL:     Standard imaging protocol performed                RADIATION:     DLP: 3139mGy*cm          Automated exposure control was utilized to minimize radiation dose.       CONTRAST:   100cc Isovue 370 I.V.      LABS:     eGFR: >60ml/min/1.73m2             FINDINGS:         CT scan of the chest:             There is some mild dependent bilateral subsegmental atelectasis.  The lungs are     otherwise clear.        There are a few small mediastinal lymph nodes.  There is a right peritracheal     lymph node 1.5 cm in       dimension.  There is a small sub carinal lymph node 1.6 x 0.6 cm in dimension.      There is a lymph       node to the left of the midportion of the  bronchus intermedius measuring 1.3 cm     in dimension and       there is a right hilar lymph node anterior to the lower portion of the right     lower lobe pulmonary       artery measuring 1.6 x 1.0 cm in dimension.  There is no axillary adenopathy.  B    one window imaging       is unremarkable.  There are hypertrophic degenerative changes of the thoracic     spine.             CT scan of the abdomen and pelvis:             There is mild fatty infiltration of the liver.  The spleen and adrenal glands     and pancreas appear       normal period there are several small rounded areas of low density in the     kidneys most likely       reflecting cysts the largest involving the upper pole of the left kidney 7.3 cm     in dimension.        There is mild sigmoid diverticulosis.  There is no surrounding soft tissue str    anding to suggest       inflammation and active diverticulitis however there is some mild uniform     thickening of the wall of       the sigmoid colon which could reflect changes of muscular hypertrophy possibly     from prior bouts of       diverticulitis.  The appendix appears normal.  The small bowel appears normal.      There is no       inguinal or pelvic or retroperitoneal adenopathy.  Surgical clips suggest prior     cholecystectomy.        There are degenerative changes of the lumbar spine.             CONCLUSION:                1.  Several small to borderline enlarged mediastinal and right hilar lymph     nodes.  Although the       significance of these is unclear in given the patient's history and lack of     previous studies for       comparison, these may be post reactive in nature.             2.  Mild fatty infiltration of the liver.             3.  Sigmoid diverticulosis without diverticulitis.              BLAKE MONACO MD             Electronically Signed and Approved By: BLAKE MONACO MD on 10/18/2018 at 13:41                           Until signed, this is an unconfirmed  preliminary report that may contain      errors and is subject to change.                                              SCHJ1:      D:10/18/18 7321            PAST, FAMILY   Past Medical History      Past Medical History:  No Diabetes Type 1; Diabetes Type 2; No Thyroid Disease,     No COPD, No Emphysema, No Hypertension, No Stroke, No High Cholesterol, No Heart    Attack, No Bleeding Condition, No Low or High RBC Count, No Low or High WBC     Count, No Low or High Platelet Coun, No Hepatitis, No Kidney Disease;     Depression; No Alzheimer's Disease, No Mental Disease, No Seizures; Arthritis;     No Osteoporosis, No Osteopenia, No Short of Air; Sleep apnea; No Liver Disease,     No STD, No Enlarged Prostate, No Other      Hematology/oncology:  REPORTS HX OF: Lymphoma (B-CELL); DENIES HX OF: Previous     Treatment for CA, Anemia, Bladder Cancer, Blood cancer, Brain cancer, Breast     cancer, Coagulopathy, Colon Cancer, Colorectal cancer, Endocrine cancer, Eye     cancer, GI cancer,  cancer, Kidney cancer, Leukemia, Leukocytosis, Leukopenia,    Liver cancer, Lung cancer, Musculoskeletal cancer, Myeloma, Neurologic cancer,     Oral cancer, Pancreatic Cancer, Prostate cancer, Skin cancer, Stomach cancer,     Testicular cancer, Thrombocytopenia, Thyroid cancer, Other cancer history, Other    hematologic history      Genetic/metabolic:  DENIES HX OF: Cystic fibrosis, Down syndrome, Other genetic     history, Other metabolic history            Past Surgical History      REPORTS HX OF: Cholecystectomy, Joint replacement (BOTH KNEES), Biopsy, Other Pa    st Surgical Hx (ABSESS PART OF COLON OUT, NECK SURGERY, SLEEP APNEA SURGERY,     CHIN CUT OFF); DENIES HX OF: Cataract extraction, Thyroid surgery, Lung biopsy,     CABG surgery, Coronary stent, Valve replacement, Appendectomy, Splenectomy,     Bladder surgery, Nephrectomy, Frature repair, Skin cancer removal, Melanoma     excision, Spinal surgery, Breast biopsy,  Lumpectomy, Mastectomy, bilateral,     Mastectomy, right, Mastectomy, left, Hysterectomy, Peg Tube Placement, VAD     Placement            Family History      REPORTS HX OF: Lymphoma (MOTHER, BROTHER)            Social History      Lives independently:  No            Tobacco Use      Tobacco status:  Current every day smoker      Smoking packs/day:  2            Alcohol Use      Alcohol intake:  None            Substance Use      Substance use:  Denies use            REVIEW OF SYSTEMS      General:  Complains of: Fatigue; Denies: Appetite change, Excessive sweating, F    ever, Night sweats, Weight gain, Weight loss, Other      Eyes:  Denies: Blurred vision, Corrective lenses, Diplopia, Eye irritation, Eye     pain, Eye redness, Spots in vision, Vision loss, Other      Ears, nose, mouth, throat:  Denies: Headache, Seizures, Visual Changes, Hearing     loss, Sinus Congestion, Hoarseness, Sore throat, Other      Cardiovascular:  Denies: Chest pain, Irregular heartbeat, Palpitations, Swollen     ankles/legs, Other      Respiratory:  Denies: Chest pain, Shortness of Air, Productive cough, Coughing     blood, Other      Gastrointestinal:  Denies: Nausea, Vomiting, Problem swallowing, Frequent     heartburn, Constipation, Diarrhea, Tarry stools, Bloody stools, Unable to co    ntrol bowels, Other      Kidney/Bladder:  Denies: Painful Urination, Blood in Urine, Incontinence,     Frequent Urination, Decreased Urine Stream, Other      Musculoskeletal:  Denies: New Back pain, Leg Cramps, Painful Joints, Swollen     Joints, Muscle Pain, Muscle weakness, Other      Skin:  DENIES: Bruises Easily, Hair changes, Lesions/changes in moles, Nail     changes, Pigment changes, Rash, Other      Neurological:  Denies: Dizziness, Fainting, Numbness\Tingling, Paralysis,     Seizures, Other      Psychiatric:  Complains of: AAO X 3, Depression; Denies: Anxiety, Panic attacks,    Memory loss, Other      Endocrine:  Denies DiabetesThyroid  DisorderOsteoporosisEndocrine Other      Hematologic/lymphatic:  Denies: Bruising, Bleeding, Enlarged Lymph Nodes,     Recurrent infections, Other            VITAL SIGNS,PAIN/FATIGUE SCORE      Vitals      Height 5 ft 9.29 in / 176 cm      Weight 276 lbs 3.782 oz / 125.3 kg      BSA 2.38 m2      BMI 40.5 kg/m2      Temperature 97.6 F / 36.44 C - Temporal      Pulse 60      Respirations 20      Blood Pressure 150/70 Sitting, Left Arm      Pulse Oximetry 97%, rm air            Pain Score      Experiencing any pain?:  Yes      Pain Scale Used:  Numerical      Pain Intensity:  7            Fatigue Score      Experiencing any fatigue?:  Yes      Fatigue (0-10 scale):  7            EXAM      General Appearance:  Alert, Oriented X3, Cooperative, No acute distress      Eyes:  Anicteric Sclerae, Moist Conjunctiva      HEENT:  Orophraynx clear, Other (no tonsillar hypertrophy)      Neck:  Supple, No Masses or JVD      Respiratory:  CTAB, Normal Respiratory Effort      Abdomen:  Bowel Sounds, Soft;          No Distention, No Hepatosplenomegaly, No Tenderness      Cardio:  RRR, No Murmur, No, Peripheral Edema      Psychiatric:  Appropriate Affect, Intact Judgement      Muscularskeletal:  Other (right hand with fourth digit contracture. Open skin     graft on the extensor surface)      Lymphatic:  No Axillary, No Cervical, No Epitrochlear, No Infraclavicular, No     Inguinal, No Occipital, No Posterior auricular, No Preauricular, No     Supraclavicular            PREVENTION      Hx Influenza Vaccination:  No      Influenza Vaccine Declined:  Yes      2 or More Falls Past Year?:  No      Fall Past Year with Injury?:  No      Hx Pneumococcal Vaccination:  No      Encouraged to follow-up with:  PCP regarding preventative exams.      Chart initiated by      bambi sainz ma            IMPRESSION/PLAN      Impression      Diffuse large B-cell lymphoma. Stage IIIa. Status post 5/6 R CHOP chemotherapy     with complete response             Diagnosis      Malignant lymphoma, large B-cell, diffuse         Diffuse large B-cell lymphoma, unspecified body region - C83.30         Lymphoma site: unspecified region      Patient is doing well. I see no evidence of disease recurrence versus history,     physical examination, laboratory recent scans. He has mild anemia on his most     recent CBC and this will be watched. He continues to have issues from a     chemotherapy extravasation and I encouraged him to push forward with physical     therapy. Flu shot when available. RTC 3 months for ongoing surveillance with     labs and scans prior.      New Diagnostics      * LDH, 3 Months      * CMP Comp Metabolic Panel, 3 Months      * CBC With Auto Diff, 3 Months      * Abd/Pel W/ Cont CT, 3 Months      * Chest W/ Cont CT, 3 Months            Patient Education      Patient Education Provided:  Yes                 Disclaimer: Converted document may not contain table formatting or lab diagrams. Please see [employer] [facility] for the authenticated document.

## 2021-05-28 NOTE — PROGRESS NOTES
Patient: GUERDA WESTON     Acct: [id number]     Report: #[id number]  UNIT #: [id number]     : 1960    Encounter Date:2019  PRIMARY CARE: WALLACE RODRIGUEZ  ***Signed***  --------------------------------------------------------------------------------------------------------------------  NURSE INTAKE      Visit Type      Established Patient Visit            Chief Complaint      LYMPHOMA F/U            Referring Provider/Copies To      Primary Care Provider:  WALLACE RODRIGUEZ            History and Present Illness      Past Oncology Illness History      Mr. house is a very pleasant 58-year-old gentleman with diffuse large cell B-    cell lymphoma, stage IIIB diagnosed in 2017. Patient has been treated     now with 5 cycles of CHOP plus Rituxan with which he was able to achieve a     complete remission. His most recent PET scan is unremarkable for any residual     uptake. Unfortunately his course was been complicated by significant tissue     necrosis and ulceration of the right hand secondary to chemotherapy     infiltration, most likely related to anthracyclines. Patient continues to be     very concerned about dysfunction of his right hand, he is currently under the     care of the wound management at [facility] but is not satisfied with     his recovery.  As far as lymphoma is concerned he is in complete remission and     does not need any further intervention at this stage even though he has received    5 of the intended 6 cycles of chemoimmunotherapy.  Pt seeing Dr. Donovan, hand     surgeon at [facility] who is planning to do repair surgery and possibly     grafting.            HPI - Oncology Interim      Patient returns today for follow-up of his lymphoma.  He is currently on     observation.  He states he has been doing well since his last visit.  He denies     any swollen or painful lymphadenopathy.  He denies fever, chills, weight loss,     night sweats.  His energy  level is low but adequate for his daily needs.  He     reports he does not exercise.  He denies excessive bruising or bleeding.  He     does state that he fell out of bed about a week ago and injured his right side.     He is having a little bit of pain in that area but it is improving.            Cancer Details            Left groin DLBCL-positive BCL6/MCL/CEP8 and IGH            Clinical Staging      Stage IIIB            Treatments      Chemotherapy      18 completed 5 of 6 R-CHOP            Clinical Trial Participant      No            ECOG Performance Status      0            Most Recent Lab Findings      Laboratory Tests      19 14:38            Most Recent Imaging Findings      Patient: GUERDA WESTON   Acct: #[id number]   Report: #[id number]            UNIT #: [id number]    DOS: 19 1435      INSURANCE:BLUE ACCESS NETWORK - [facility]   ORDER #:[id number]      LOCATION:CT     : 1960            PROVIDERS      ADMITTING:     ATTENDING: FLACA FUNES      FAMILY:  MICHAELWALLACE GRANADOS   ORDERING:  FLACA FUNES         OTHER:    DICTATING:  CHIQUITA YATES MD            REQ #:[id number]   EXAM:[facility] - CT ABD CHEST PEL w CONTR      REASON FOR EXAM:  LARGE B CELL LYMPHOMA      REASON FOR VISIT:  LARGE B CELL LYMPHOMA            *******Signed******         PROCEDURE:   CT ABDOMEN; CT CHEST; CT PELVIS WITH CONTRAST             COMPARISON:   [facility], CT, CT CHEST ABD PEL W CONTRAST,     2019, 13:26.             INDICATIONS:   LARGE B CELL LYMPHOMA             TECHNIQUE:   After obtaining the patient's consent, CT images were obtained with    intravenous contrast       material.      PROTOCOL:     Standard imaging protocol performed                RADIATION:     DLP: 4362 mGy*cm          Automated exposure control was utilized to minimize radiation dose.       CONTRAST:   100 cc Isovue 370 I.V.      LABS:     eGFR: 47 ml/min/1.73m2      NOTES:     THE PATIENT WAS ADVISED  TO REFRAIN FROM THE USE OF METFORMIN-    CONTAINING MEDICATION FOR 48       HOURS.  RECOMMEND THAT ORDERING PHYSICIAN SCHEDULE FOLLOW UP BLOODWORK FOR     CLEARANCE TO RESUME       METFORMIN-CONTAINING MEDICATIONS.               FINDINGS:         CT chest:  The visualized soft tissue structures at the base of the neck     including the thyroid       appear within normal limits.  There is no lower cervical or axillary adenopathy.     The heart size is       normal.  There is no pericardial effusion.  The aorta is normal in caliber     without evidence of       aneurysm formation.  There is normal 4 vessel aortic arch.  The main pulmonary     artery is normal in       caliber.  There is no mediastinal or hilar lymphadenopathy by size criteria.      The appearance of the       small lymph nodes which are present are unchanged from the prior examination.      The esophagus is       normal in course and caliber.      The tracheobronchial tree is patent.  There is no abnormal bronchial wall     thickening or       bronchiectasis.  There is no pleural effusion, airspace consolidation, or     pneumothorax.  There is a       ground-glass nodule within the right upper lobe on image 20 measuring 5 mm.      This is unchanged from       prior examination. There are multilevel degenerative changes of the thoracic     spine.  There is an       age indeterminate right posterior 4th rib fracture which is new from 1/23/2019.     This is likely       subacute as there is early healing changes.             CT abdomen and pelvis:  There is mild hepatomegaly with diffuse hepatic     steatosis.  The gallbladder       is surgically absent.  There is mild prominence of the extrahepatic common bile     duct which is       unchanged from prior exam and likely related to postcholecystectomy changes.      The spleen, pancreas,       and adrenal glands appear within normal limits.  There is an exophytic simple     cyst arising from the        upper pole of the left kidney measuring 7.4 cm, unchanged from the prior     examination.  There are       additional smaller fluid density cyst arising from the lower poles of the k    idneys.  There is no       hydronephrosis or hydroureter.  The urinary bladder is fluid filled without wall    thickening.  The       prostate is normal in size.             The stomach and duodenum are normal in caliber and configuration.  There are no     abnormally dilated       or thickened loops of small bowel to suggest small bowel obstruction or small     bowel inflammation.        The appendix is well visualized and normal within the right lower quadrant.      There is no evidence       of acute colitis or diverticulitis.  There is sigmoid diverticulosis.  There is     evidence of prior       sigmoid resection.  The aorta is normal in caliber without evidence of aneurysm     formation.  There       is no mesenteric, retroperitoneal, or pelvic lymphadenopathy by size criteria.      There is no       evidence of inguinal adenopathy.  There is degenerative disc disease at L4-5 and    L5-S1.  There are       no suspicious lytic or sclerotic osseous lesions.             CONCLUSION:         CT chest:      1. No mediastinal, hilar, or axillary adenopathy.      2. Stable 5 mm pulmonary nodule within the right upper lobe.      3. Probable subacute fracture of the right posterior 4th rib which is new from     1/23/2019.        Correlate clinically for injury.               CT abdomen and pelvis:      1. No lymphadenopathy within the abdomen or pelvis.      2. Mild hepatomegaly with diffuse hepatic steatosis.      3. Sigmoid diverticulosis without acute diverticulitis.                CHIQUITA YATES MD             Electronically Signed and Approved By: CHIQUITA YATES MD on 12/09/2019 at     20:58                        Until signed, this is an unconfirmed preliminary report that may contain      errors and is subject to change.             PAST, FAMILY   Past Medical History      Past Medical History:  Arthritis, Diabetes Type 2, High Cholesterol,     Hypertension, Sleep Apnea      Hematology/Oncology (M):  Lymphoma (b cell)            Past Surgical History      Biopsy (colon), Cholecystectomy, Fracture Repair (right hand x 2), Joint     Replacement (both knees), Spinal Surgery (neck)            Family History      Family History:  Lung Cancer (brother), Lymphoma (mother and brother), Melanoma     (brother)            Social History      Marital Status:        Lives independently:  Yes      Number of Children:  1      Occupation:  disabled            Tobacco Use      Tobacco status:  Current every day smoker      Smoking packs/day:  2            Alcohol Use      Alcohol intake:  None            Substance Use      Substance use:  Denies use            REVIEW OF SYSTEMS      General:  Admits: Fatigue      Eye:  Denies Blurred Vision, Denies Corrective Lenses      ENT:  Admits Hearing Loss      Cardiovascular:  Admits Chest Pain      Respiratory:  Admits: Shortness of Air;          Denies: Productive Cough      Gastrointestinal:  Denies Constipation, Denies Diarrhea      Integumentary:  Admits Itching, Admits Rash      Neurologic:  Admits Dizziness, Admits Numbness\Tingling            VITAL SIGNS AND SCORES      Vitals      Weight 284 lbs 13.349 oz / 129.2 kg      Temperature 97.9 F / 36.61 C - Temporal      Pulse 63      Respirations 12      Blood Pressure 159/69 Sitting, Left Arm      Pulse Oximetry 97%, RM AIR            Pain Score      Pain Scale Used:  Numerical      Pain Intensity:  0            Fatigue Score      Fatigue (0-10 scale):  7            EXAM      General Appearance:  Positive for: Alert, Oriented x3, Cooperative, Obese;          Negative for: Acute Distress      Eye:  Positive for: Anicteric Sclerae, Moist Conjunctiva      HEENT:  Positive for: Oropharynx clear      Other      No tonsillar hypertrophy      Neck:   Positive for: Supple;          Negative for: JVD, Masses      Respiratory:  Positive for: CTAB, Normal Respiratory Effort      Abdomen/Gastro:  Positive for: Normal Active Bowel Sounds, Soft;          Negative for: Distention, Hepatosplenomegaly, Tenderness      Cardiovascular:  Positive for: RRR;          Negative for: Gallop, Murmur, Peripheral Edema, Rub      Psychiatric:  Positive for: Appropriate Affect, Intact Judgement      Lymphatic:  Negative for: Axillary, Cervical, Epitrochlear, Infraclavicular,     Occipital, Posterior auricular, Preauricular, Supraclavicular            PREVENTION      Hx Influenza Vaccination:  Yes      Date Influenza Vaccine Given:  Dec 1, 2019      Influenza Vaccine Declined:  Yes      2 or More Falls Past Year?:  Yes      Fall Past Year with Injury?:  No      Hx Pneumococcal Vaccination:  Yes      Encouraged to follow-up with:  PCP regarding preventative exams.      Chart initiated by      ARLENE REDDY MA            ALLERGY/MEDS      Allergies      Coded Allergies:             NO KNOWN DRUG ALLERGIES (Verified  Allergy, Unknown, 12/11/19)            Medications      Last Reconciled on 12/11/19 09:07 by FLACA FUNES      Lisinopril/HCTZ (Zestoretic 10/12.5 MG*) 1 Tab Tablet      1 TAB PO QDAY for 30 Days, #30 TAB         Reported         10/3/19       Hydroxychloroquine Sulfate (Plaquenil*) 200 Mg Tab      400 MG PO BID, TAB         Reported         7/2/18       Metformin Hcl (Metformin Hcl) 500 Mg Tablet      500 MG PO BID, #60 TAB 0 Refills         Reported         6/6/18       Omeprazole (PriLOSEC*) 40 Mg Capsule.dr      40 MG PO HS for 30 Days, #30 CAP 0 Refills         Reported         6/6/18       Citalopram HBr (CeleXA) 40 Mg Tablet      40 MG PO HS, #30 TAB 0 Refills         Reported         6/6/18       Carbamazepine (TEGretol-XR*) 200 Mg Tab.er.12h      200 MG PO BID, #60 TAB.SR 0 Refills         Reported         6/6/18       Promethazine HCl (Phenergan*) 25 Mg Tablet       25 MG PO BID, #60 TAB 0 Refills         Reported         6/6/18       Hydrocodone/Acetaminophen 10/325 MG (Hydrocodone/Acetaminophen 10/325 MG) 1 Each    Tablet      1 TAB PO Q4H PRN for BREAKTHROUGH PAIN, TAB 0 Refills         Reported         6/6/18       LORazepam (LORazepam) 1 Mg Tablet      1 MG PO BID, TAB         Reported         6/6/18       Fenofibrate (Fenofibrate*) 48 Mg Tablet      48 MG PO QDAY, TAB         Reported         6/6/18      Medications Reviewed:  No Changes made to meds            IMPRESSION/PLAN      Diagnosis      Malignant lymphoma, large B-cell, diffuse         Diffuse large B-cell lymphoma, unspecified body region         Lymphoma site: unspecified region            Notes      Status post treatment as above.  Patient is doing well.  I see no evidence of     disease recurrence by his history, physical examination, lab work recent scans.     Incidental note is made of a right rib fracture likely related to his fall     earlier.  There is already healing in the area and his pain is improving.  No     intervention required at this point.  He can follow-up with his primary care     provider if the rib becomes more bothersome to him.  I will plan to see him back    in 6 months for ongoing surveillance labs and scans prior.      New Diagnostics      * CT Abd/Pelvis/Chest W/Contrast, 6 Months         Dx: Malignant lymphoma, large B-cell, diffuse - C83.30      * CBC With Auto Diff, 6 Months         Dx: Malignant lymphoma, large B-cell, diffuse - C83.30      * CMP Comp Metabolic Panel, 6 Months         Dx: Malignant lymphoma, large B-cell, diffuse - C83.30      * LDH, 6 Months         Dx: Malignant lymphoma, large B-cell, diffuse - C83.30            Patient Education            Eat Well, Exercise Well, Be Well: Dietary and Fitness Guidelines      Patient Education Provided:  Yes            Electronically signed by FLACA FUNES  12/11/2019 09:07       Disclaimer: Converted document may not  contain table formatting or lab diagrams. Please see [facility] for the authenticated document.

## 2021-05-28 NOTE — PROGRESS NOTES
Patient: GUERDA WESTON     Acct: [id number]     Report: #[id number]  UNIT #: [id number]     : 1960    Encounter Date:2018  PRIMARY CARE: WALLACE RODRIGUEZ  ***Signed***  --------------------------------------------------------------------------------------------------------------------  NURSE INTAKE      Encounter Date      2018            Providers      Provider Not Found in Lookup:  WALLACE RODRIGUEZ            Visit Type      New Patient Visit            Chief Complaint      B-CELL LYMPHOMA            Allergies      Coded Allergies:             NO KNOWN DRUG ALLERGIES (Verified  Allergy, Unknown, 18)            Medications      Last Reconciled on 18 15:57 by KYLIE GARCIA MD      Fentanyl (Fentanyl-100*) 1 Each Patch.td72      100 MCG TRANSDERM Q72H@13, PATCH         Reported         18       Hydroxychloroquine Sulfate (Plaquenil*) 200 Mg Tab      400 MG PO BID, TAB         Reported         18       Metformin Hcl* (Metformin Hcl*) 500 Mg Tablet      500 MG PO BID, #60 TAB 0 Refills         Reported         18       Omeprazole (PriLOSEC*) 40 Mg Capsule.dr      40 MG PO HS for 30 Days, #30 CAP 0 Refills         Reported         18       Citalopram HBr (CeleXA) 40 Mg Tablet      40 MG PO HS, #30 TAB 0 Refills         Reported         18       Carbamazepine (TEGretol-XR*) 200 Mg Tab.er.12h      200 MG PO BID, #60 TAB.SR 0 Refills         Reported         18       Promethazine HCl (Phenergan*) 25 Mg Tablet      25 MG PO BID, #60 TAB 0 Refills         Reported         18       Hydrocodone/Acetaminophen 10/325 MG (Hydrocodone/Acetaminophen 10/325 MG) 1     Each Tablet      1 TAB PO Q4H Y for BREAKTHROUGH PAIN, TAB 0 Refills         Reported         18       Hctz/Spironolactone 25/25 Mg (Aldactazide 25/25 Mg) 1 Each Tablet      1 TAB PO QDAY, #30 TAB         Reported         18       LORazepam (LORazepam) 1 Mg Tablet      1 MG PO BID, TAB          Reported         6/6/18       Fenofibrate (Fenofibrate*) 48 Mg Tablet      48 MG PO QDAY, TAB         Reported         6/6/18      Medications Reviewed:  Changes made to meds            PAST, FAMILY   Past Medical History      Past Medical History:  No Diabetes Type 1, Yes Diabetes Type 2, No Thyroid     Disease, No COPD, No Emphysema, No Hypertension, No Stroke, No High Cholesterol    , No Heart Attack, No Bleeding Condition, No Low or High RBC Count, No Low or     High WBC Count, No Low or High Platelet Coun, No Hepatitis, No Kidney Disease,     Yes Depression, No Alzheimer's Disease, No Mental Disease, No Seizures, Yes     Arthritis, No Osteoporosis, No Osteopenia, No Short of Air, Yes Sleep apnea, No     Liver Disease, No STD, No Enlarged Prostate, No Other      Hematology/oncology:  REPORTS HX OF: Lymphoma (B-CELL), DENIES HX OF: Previous     Treatment for CA, Anemia, Bladder Cancer, Blood cancer, Brain cancer, Breast     cancer, Coagulopathy, Colon Cancer, Colorectal cancer, Endocrine cancer, Eye     cancer, GI cancer,  cancer, Kidney cancer, Leukemia, Leukocytosis, Leukopenia    , Liver cancer, Lung cancer, Musculoskeletal cancer, Myeloma, Neurologic cancer    , Oral cancer, Pancreatic Cancer, Prostate cancer, Skin cancer, Stomach cancer,     Testicular cancer, Thrombocytopenia, Thyroid cancer, Other cancer history,     Other hematologic history      Genetic/metabolic:  DENIES HX OF: Cystic fibrosis, Down syndrome, Other genetic     history, Other metabolic history            Past Surgical History      REPORTS HX OF: Cholecystectomy, Joint replacement (BOTH KNEES), Biopsy, Other     Past Surgical Hx (ABSESS PART OF COLON OUT, NECK SURGERY, SLEEP APNEA SURGERY,     CHIN CUT OFF), DENIES HX OF: Cataract extraction, Thyroid surgery, Lung biopsy,     CABG surgery, Coronary stent, Valve replacement, Appendectomy, Splenectomy,     Bladder surgery, Nephrectomy, Frature repair, Skin cancer removal, Melanoma      excision, Spinal surgery, Breast biopsy, Lumpectomy, Mastectomy, bilateral,     Mastectomy, right, Mastectomy, left, Hysterectomy, Peg Tube Placement, VAD     Placement            Family History      REPORTS HX OF: Lymphoma (MOTHER, BROTHER)            Social History      Lives independently:  No            Tobacco Use      Tobacco status:  Current every day smoker      Smoking packs/day:  2            Alcohol Use      Alcohol intake:  None            Substance Use      Substance use:  Denies use            HISTORY OF PRESENT ILLNESS      Interim History            Mr. montague is a very pleasant 58-year-old gentleman with a history of stage IIIB     diffuse large cell lymphoma treated with CHOP plus Rituxan for a total of 5     cycles. His most recent PET scan which was done on 06/19/2018 shows significant     response with resolution of FDG a combination in the left supraclavicular     region as well as marked decrease in the size of abnormal left inguinal lymph     node with resolution of abnormal FDG accumulation. There are no other areas of     abnormal uptake.            History and Physical            Mr. montague is a very pleasant 57-year-old gentleman who has been treated for     diffuse large cell lymphoma with CHOP plus Rituxan for a total of 5 cycles of     chemotherapy. Patient comes for a second opinion.      Mr. montague initially presented with night sweats and a mass in the left groin     when he underwent staging studies with CT scan followed by a PET scan. He was     found to have a hypermetabolic mass in the left groin and supraclavicular     regions. He was also having significant night sweats. He underwent biopsy of     the lymph node from the left inguinal area which was diagnostic of diffuse     large cell B-cell lymphoma.       Large B cells were positive for CD20 CD10 and BCL 6 but negative for BCL-2 and     mum 1 as well as cyclin D1, CD5 and CD 30. Ki-67 was about 60%.       Final pathology was  diffuse large B-cell lymphoma CD10 positive germinal center     type.      The lymphoma was positive BCL- 6 and myc gene rearrangements.       Final staging was stage IIIB. Patient has been treated with R CHOP for a total     of 5 cycles of chemotherapy.       Unfortunately his course was complicated by significant chemotherapy     infiltration on his right hand with tissue necrosis and ulceration because of     which he has been referred for wound care at [facility].      Patient continues to be very concerned about the dysfunction of his right hand     because he has had some damage to the tendons and he has not able to use his     right hand.            REVIEW OF SYSTEMS      General:  Complains of: Fatigue, Denies: Appetite change, Excessive sweating,     Fever, Night sweats, Weight gain, Weight loss, Other      Eyes:  Denies: Blurred vision, Corrective lenses, Diplopia, Eye irritation, Eye     pain, Eye redness, Spots in vision, Vision loss, Other      Ears, nose, mouth, throat:  Denies: Headache, Seizures, Visual Changes, Hearing     loss, Sinus Congestion, Hoarseness, Sore throat, Other      Cardiovascular:  Denies: Chest pain, Irregular heartbeat, Palpitations, Swollen     ankles/legs, Other      Respiratory:  Denies: Chest pain, Shortness of Air, Productive cough, Coughing     blood, Other      Gastrointestinal:  Denies: Nausea, Vomiting, Problem swallowing, Frequent     heartburn, Constipation, Diarrhea, Tarry stools, Bloody stools, Unable to     control bowels, Other      Kidney/Bladder:  Denies: Painful Urination, Blood in Urine, Incontinence,     Frequent Urination, Decreased Urine Stream, Other      Musculoskeletal:  Denies: New Back pain, Leg Cramps, Painful Joints, Swollen     Joints, Muscle Pain, Muscle weakness, Other      Skin:  DENIES: Bruises Easily, Hair changes, Lesions/changes in moles, Nail     changes, Pigment changes, Rash, Other      Neurological:  Denies: Dizziness, Fainting,  Numbness\Tingling, Paralysis,     Seizures, Other      Psychiatric:  Complains of: AAO X 3, Denies: Anxiety, Panic attacks, Depression    , Memory loss, Other      Endocrine:  DiabetesThyroid DisorderOsteoporosisEndocrine Other      Hematologic/lymphatic:  Denies: Bruising, Bleeding, Enlarged Lymph Nodes,     Recurrent infections, Other            VITAL SIGNS AND SCORES      Vitals      Height 5 ft 9.29 in / 176 cm      Weight 267 lbs 10.215 oz / 121.4 kg      BSA 2.49 m2      BMI 39.2 kg/m2      Temperature 97.8 F / 36.56 C - Temporal      Pulse 60      Blood Pressure 139/66 Sitting, Left Arm      Pulse Oximetry 96%, ROOM AIR            Pain Score      Pain Assessment:           Experiencing any pain?:  Yes         Pain Scale Used:  Numerical         Pain Intensity:  8            Fatigue Score               Experiencing any fatigue?:  Yes         Fatigue (0-10 scale):  6            EXAM      Vitals            Vital Signs              Date Time  Temp Pulse Resp B/P (MAP) Pulse Ox O2 Delivery O2 Flow Rate FiO2             6/6/18 11:14 98.0 68  166/70 93 room air              General Appearance:  Alert, Oriented X3, Cooperative      Eyes:  Anicteric Sclerae, Moist Conjunctiva      HEENT:  Orophraynx clear, No Erythema, No Exudates      Neck:  Supple, Full ROM      Respiratory:  CTAB, No Diminished Breath      Abdomen\Gastro:  Soft, No NABS      Cardio:  RRR, No Murmur, No, Peripheral Edema      Skin:  Normal Temperature, No Induration      Psychiatric:  Appropriate Affect, Intact Judgement, AAO x3      Muscularskeletal:  Normal Gait and Station, Full ROM of extremeties      Extremities:  Other (tissue necrosis with ulceration of right hand)      Lymphatic:  No Cervical, No Supraclavicular, No Infraclavicular, No Axillary,     No Inguinal, No Other            PREVENTION      Hx Influenza Vaccination:  No      Influenza Vaccine Declined:  Yes      2 or More Falls Past Year?:  No      Fall Past Year with Injury?:  No       Hx Pneumococcal Vaccination:  No      Encouraged to follow-up with:  PCP regarding preventative exams.      Chart initiated by      VIKKI KIDD CMA            IMPRESSION/PLAN      Current Plan            Mr. montague is a very pleasant 58-year-old gentleman with diffuse large cell B-    cell lymphoma, stage IIIB diagnosed in November 2017. Patient has been treated     now with 5 cycles of CHOP plus Rituxan with which he was able to achieve a     complete remission. His most recent PET scan is unremarkable for any residual     uptake.       Unfortunately his course has been complicated by significant tissue necrosis     and ulceration of the right hand secondary to chemotherapy infiltration, most     likely related to anthracyclines. Patient continues to be very concerned about     dysfunction of his right hand, he is currently under the care of the wound     management at [facility] but is not satisfied with his recovery. Our     plan at this time is to send him for evaluation at the hand Center.             As far as lymphoma is concerned he is in complete remission and does not need     any further intervention at this stage even though he has received 5 of the     intended 6 cycles of chemoimmunotherapy.            Plan      Open wound of right hand - S61.401A            B-cell lymphoma - C85.10            Notes      New Medications      * Hydroxychloroquine Sulfate (Plaquenil*) 200 MG TAB: 400 MG PO BID      * Fentanyl (Fentanyl-100*) 1 EACH PATCH.TD72: 100 MCG TRANSDERM Q72H@13      New Diagnostics      * CBC, As Soon As Possible       Dx: Open wound of right hand - S61.401A      * CMP Comp Metabolic Panel, Stat       Dx: Open wound of right hand - S61.401A      * LDH, Stat       Dx: Open wound of right hand - S61.401A      New Referrals      * Hand Surgeon, As Soon As Possible       Reason for Referral: evaluate for surgery       Dx: Open wound of right hand - S61.401A            Patient Education       Patient Education Provided:  Yes                 Disclaimer: Converted document may not contain table formatting or lab diagrams. Please see [employer] System for the authenticated document.

## 2021-05-28 NOTE — PROGRESS NOTES
"Patient: GUERDA WESTON     Acct: [id number]     Report: #[id number]  UNIT #: [id number]     : 1960    Encounter Date:2019  PRIMARY CARE: WALLACE RODRIGUEZ  ***Signed***  --------------------------------------------------------------------------------------------------------------------  NURSE INTAKE      Visit Type      Established Patient Visit            Chief Complaint      MY ARM RIGHT HERE.            Referring Provider/Copies To      Provider Not Found in Lookup:  WALLACE RODRIGUEZ      Primary Care Provider:  WALLACE RODRIGUEZ            History and Present Illness      Past Oncology Illness History      Mr. house is a very pleasant 58-year-old gentleman with diffuse large cell B-    cell lymphoma, stage IIIB diagnosed in 2017. Patient has been treated     now with 5 cycles of CHOP plus Rituxan with which he was able to achieve a     complete remission. His most recent PET scan is unremarkable for any residual     uptake. Unfortunately his course was been complicated by significant tissue     necrosis and ulceration of the right hand secondary to chemotherapy     infiltration, most likely related to anthracyclines. Patient continues to be     very concerned about dysfunction of his right hand, he is currently under the     care of the wound management at [facility] but is not satisfied with     his recovery.  As far as lymphoma is concerned he is in complete remission and     does not need any further intervention at this stage even though he has received    5 of the intended 6 cycles of chemoimmunotherapy.  Pt seeing Dr. Donovan, hand     surgeon at [facility] who is planning to do repair surgery and possibly     grafting.            HPI - Oncology Interim      59-year-old white male with diffuse large B-cell lymphoma status post treatments    as above.  He was seen earlier this month and was doing well.  He called     yesterday complaining of a new \"knot\" on his " right forearm.  He just noticed it     the day previous.  It was not painful but he felt it when he rubbed his arm.  He    denies any trauma to the area.  There is no bruising or bleeding.  He also notes    chest congestion and cough productive of scant greenish sputum over the last 3     to 4 days.  He denies fever chills.  He reports that the right hand which has     had surgery is giving him a little more problems.  He has not followed up with     his hand surgeon yet.            Cancer Details            Left groin DLBCL-positive BCL6/MCL/CEP8 and IGH            Clinical Staging      Stage IIIB            Treatments      Chemotherapy      4/5/18 completed 5 of 6 R-CHOP            Clinical Trial Participant      No            ECOG Performance Status      0            PAST, FAMILY   Past Medical History      Past Medical History:  Arthritis, Diabetes Type 2, High Cholesterol,     Hypertension, Sleep Apnea      Hematology/Oncology (M):  Lymphoma (b cell)            Past Surgical History      Biopsy (colon), Cholecystectomy, Fracture Repair (right hand x 2), Joint R    eplacement (both knees), Spinal Surgery (neck)            Family History      Family History:  Lung Cancer (brother), Lymphoma (mother and brother), Melanoma     (brother)            Social History      Marital Status:        Lives independently:  Yes      Number of Children:  1      Occupation:  disabled            Tobacco Use      Tobacco status:  Current every day smoker      Smoking packs/day:  2            Alcohol Use      Alcohol intake:  None            Substance Use      Substance use:  Denies use            REVIEW OF SYSTEMS      General:  Denies: Fatigue      Eye:  Denies: Corrective Lenses      Cardiovascular:  Denies: Chest Pain      Respiratory:  Admits: Shortness of Air      Gastrointestinal:  Admits: Nausea      Musculoskeletal:  Admits: Back Pain, Painful Joints (HIPS)      Integumentary:  Denies: Skin Discoloration       Neurologic:  Admits: Numbness\Tingling (RIGHT HAND)      Endocrine:  Admits: Diabetes      Hematologic/Lymphatic:  Admits: Enlarged Lymph Nodes            VITAL SIGNS AND SCORES      Vitals      Weight 277 lbs 12.474 oz / 126 kg      Temperature 97.9 F / 36.61 C - Temporal      Pulse 57      Respirations 16      Blood Pressure 174/70 Sitting, Left Arm      Pulse Oximetry 97%, RM AIR            Pain Score      Pain Scale Used:  Numerical      Pain Intensity:  8 (BACK)            Fatigue Score      Fatigue (0-10 scale):  0 (none)            EXAM      General Appearance:  Positive for: Alert, Oriented x3, Cooperative, Obese;          Negative for: Acute Distress      Neck:  Positive for: Supple;          Negative for: JVD, Masses      Respiratory:  Positive for: Normal Respiratory Effort;          Negative for: CTAB (Scattered end expiratory wheezes and crackles)      Abdomen/Gastro:  Positive for: Normal Active Bowel Sounds, Soft;          Negative for: Distention, Hepatosplenomegaly, Tenderness      Psychiatric:  Positive for: Appropriate Affect, Intact Judgement      Upper Extremities:  Positive for: Deformities (Surgical defects on the extensor     surface of the right hand)      Other      Right upper extremity reveals a 0.5 cm fleshy nodule in the subcutaneous area of    the proximal forearm.  Freely mobile, nontender, no erythema      Lymphatic:  Negative for: Axillary, Cervical, Infraclavicular, Supraclavicular            PREVENTION      Hx Influenza Vaccination:  Yes      Date Influenza Vaccine Given:  Nov 1, 2018      Influenza Vaccine Declined:  Yes      2 or More Falls Past Year?:  No      Fall Past Year with Injury?:  No      Hx Pneumococcal Vaccination:  Yes      Encouraged to follow-up with:  PCP regarding preventative exams.      Chart initiated by      ARLENE REDDY MA            ALLERGY/MEDS      Allergies      Coded Allergies:             NO KNOWN DRUG ALLERGIES (Verified  Allergy, Unknown,  8/28/19)            Medications      Last Reconciled on 2/21/19 08:27 by CHELSEA SEARS      Hydroxychloroquine Sulfate (Plaquenil*) 200 Mg Tab      400 MG PO BID, TAB         Reported         7/2/18       Metformin Hcl (Metformin Hcl) 500 Mg Tablet      500 MG PO BID, #60 TAB 0 Refills         Reported         6/6/18       Omeprazole (PriLOSEC*) 40 Mg Capsule.dr      40 MG PO HS for 30 Days, #30 CAP 0 Refills         Reported         6/6/18       Citalopram HBr (CeleXA) 40 Mg Tablet      40 MG PO HS, #30 TAB 0 Refills         Reported         6/6/18       Carbamazepine (TEGretol-XR*) 200 Mg Tab.er.12h      200 MG PO BID, #60 TAB.SR 0 Refills         Reported         6/6/18       Promethazine HCl (Phenergan*) 25 Mg Tablet      25 MG PO BID, #60 TAB 0 Refills         Reported         6/6/18       Hydrocodone/Acetaminophen 10/325 MG (Hydrocodone/Acetaminophen 10/325 MG) 1 Each    Tablet      1 TAB PO Q4H PRN for BREAKTHROUGH PAIN, TAB 0 Refills         Reported         6/6/18       Hctz/Spironolactone 25/25 Mg (ALDACTAZIDE 25/25) 1 Each Tablet      1 TAB PO QDAY, #30 TAB         Reported         6/6/18       LORazepam (LORazepam) 1 Mg Tablet      1 MG PO BID, TAB         Reported         6/6/18       Fenofibrate (Fenofibrate*) 48 Mg Tablet      48 MG PO QDAY, TAB         Reported         6/6/18      Medications Reviewed:  No Changes made to meds            IMPRESSION/PLAN      Diagnosis      Malignant lymphoma, large B-cell, diffuse         Diffuse large B-cell lymphoma, unspecified body region - C83.30         Lymphoma site: unspecified region      Status post treatment as above.  Patient returns today concerned that a swelling    area on the arm was associated with his lymphoma.  This is not an area that     would typically have a notable station.  On examination it feels like a small     lipoma or other subcutaneous nodule.  I suggested that the area just be mon    itored.  If it continues to enlarge or  begins causing symptoms then excision     would be reasonable.            Bronchitis - J40      Patient will be treated with a Z-Jasson.  I encouraged him to use his nebulizer at     home.  Follow-up with PCP if no improvement            Notes      New Medications      * Azithromycin Z-Jasson (Zithromax Z-Jasson) 250 MG TABLET: 250 MG PO ASDIR #1         Instructions: Take 500 mg (two tablets) by mouth the first day, then 250 mg        (one tablet) daily until all taken.            Patient Education            Bronchitis (Adult)      Patient Education Provided:  Yes                 Disclaimer: Converted document may not contain table formatting or lab diagrams. Please see [employer] System for the authenticated document.

## 2021-05-28 NOTE — PROGRESS NOTES
"Patient: GUERDA WESTON     Acct: [id number]     Report: #[id number]  UNIT #: [id number]     : 1960    Encounter Date:10/03/2019  PRIMARY CARE: WALLACE RODRIGUEZ  ***Signed***  --------------------------------------------------------------------------------------------------------------------  NURSE INTAKE      Visit Type      Established Patient Visit            Chief Complaint      KNOT I FOUND            Referring Provider/Copies To      Provider Not Found in Lookup:  WALLACE RODRIGUEZ      Primary Care Provider:  WALLACE RODRIGUEZ            History and Present Illness      Past Oncology Illness History      Mr. house is a very pleasant 58-year-old gentleman with diffuse large cell B-    cell lymphoma, stage IIIB diagnosed in 2017. Patient has been treated     now with 5 cycles of CHOP plus Rituxan with which he was able to achieve a compl    ete remission. His most recent PET scan is unremarkable for any residual uptake.    Unfortunately his course was been complicated by significant tissue necrosis and    ulceration of the right hand secondary to chemotherapy infiltration, most likely    related to anthracyclines. Patient continues to be very concerned about     dysfunction of his right hand, he is currently under the care of the wound     management at [facility] but is not satisfied with his recovery.  As     far as lymphoma is concerned he is in complete remission and does not need any     further intervention at this stage even though he has received 5 of the intended    6 cycles of chemoimmunotherapy.  Pt seeing Dr. Donovan, hand surgeon at [facility] who is planning to do repair surgery and possibly grafting.            HPI - Oncology Interim      Acute visit-returns to clinic--c/o \"knot\" at mid-upper left thigh area.  He no    ticed it a couple of days ago while showering.  Denies pain, warmth or     redness--he just felt it.  Denies fever, chills, weight loss, " night sweats.      Appetite is unchanged; wt stable.  Pt very drowsy with speech unclear at times.     Wife states he is very tired; not sleeping well.  No distress noted.            Cancer Details            Left groin DLBCL-positive BCL6/MCL/CEP8 and IGH            Clinical Staging      Stage IIIB            Treatments      Chemotherapy      4/5/18 completed 5 of 6 R-CHOP            Clinical Trial Participant      No            ECOG Performance Status      0            PAST, FAMILY   Past Medical History      Past Medical History:  Arthritis, Diabetes Type 2, High Cholesterol,     Hypertension, Sleep Apnea      Hematology/Oncology (M):  Lymphoma (b cell)            Past Surgical History      Biopsy (colon), Cholecystectomy, Fracture Repair (right hand x 2), Joint     Replacement (both knees), Spinal Surgery (neck)            Family History      Family History:  Lung Cancer (brother), Lymphoma (mother and brother), Melanoma     (brother)            Social History      Marital Status:        Lives independently:  Yes      Number of Children:  1      Occupation:  disabled            Tobacco Use      Tobacco status:  Current every day smoker      Smoking packs/day:  2            Alcohol Use      Alcohol intake:  None            Substance Use      Substance use:  Denies use            REVIEW OF SYSTEMS      General:  Admits: Fatigue, Weight Gain      ENT:  Admits Hearing Loss      Cardiovascular:  Admits Chest Pain      Respiratory:  Admits: Shortness of Air      Gastrointestinal:  Admits Diarrhea; Denies Nausea/Vomiting      Musculoskeletal:  Denies Muscle Pain      Integumentary:  Denies Itching, Denies Rash      Neurologic:  Denies Dizziness, Denies Numbness\Tingling            VITAL SIGNS AND SCORES      Vitals      Weight 281 lbs 1.384 oz / 127.5 kg      Temperature 98.4 F / 36.89 C - Oral      Pulse 66      Respirations 18      Blood Pressure 127/61 Sitting, Left Arm      Pulse Oximetry 97%, RM AIR             Pain Score      Pain Scale Used:  Numerical      Pain Intensity:  0            Fatigue Score      Fatigue (0-10 scale):  9            EXAM      General Appearance:  Positive for: Alert, Oriented x3, Cooperative;          Negative for: Acute Distress      Other      5 mm round well-circumscribed freely mobile subcutaneous nodule on the anterior     mid left thigh            PREVENTION      Hx Influenza Vaccination:  Yes      Date Influenza Vaccine Given:  Nov 1, 2018      Influenza Vaccine Declined:  Yes      2 or More Falls Past Year?:  No      Fall Past Year with Injury?:  No      Hx Pneumococcal Vaccination:  Yes      Encouraged to follow-up with:  PCP regarding preventative exams.      Chart initiated by      ARLENE REDDY MA            ALLERGY/MEDS      Allergies      Coded Allergies:             NO KNOWN DRUG ALLERGIES (Verified  Allergy, Unknown, 10/3/19)            Medications      Last Reconciled on 10/3/19 14:16 by CHELSEA SEARS      Lisinopril/HCTZ (Zestoretic 10/12.5 MG*) 1 Tab Tablet      1 TAB PO QDAY for 30 Days, #30 TAB         Reported         10/3/19       Hydroxychloroquine Sulfate (Plaquenil*) 200 Mg Tab      400 MG PO BID, TAB         Reported         7/2/18       Metformin Hcl (Metformin Hcl) 500 Mg Tablet      500 MG PO BID, #60 TAB 0 Refills         Reported         6/6/18       Omeprazole (PriLOSEC*) 40 Mg Capsule.dr      40 MG PO HS for 30 Days, #30 CAP 0 Refills         Reported         6/6/18       Citalopram HBr (CeleXA) 40 Mg Tablet      40 MG PO HS, #30 TAB 0 Refills         Reported         6/6/18       Carbamazepine (TEGretol-XR*) 200 Mg Tab.er.12h      200 MG PO BID, #60 TAB.SR 0 Refills         Reported         6/6/18       Promethazine HCl (Phenergan*) 25 Mg Tablet      25 MG PO BID, #60 TAB 0 Refills         Reported         6/6/18       Hydrocodone/Acetaminophen 10/325 MG (Hydrocodone/Acetaminophen 10/325 MG) 1 Each    Tablet      1 TAB PO Q4H PRN for BREAKTHROUGH  PAIN, TAB 0 Refills         Reported         6/6/18       LORazepam (LORazepam) 1 Mg Tablet      1 MG PO BID, TAB         Reported         6/6/18       Fenofibrate (Fenofibrate*) 48 Mg Tablet      48 MG PO QDAY, TAB         Reported         6/6/18      Medications Reviewed:  No Changes made to meds            IMPRESSION/PLAN      Diagnosis      Cyst      Examination is most consistent with a cyst.  Asymptomatic.  No intervention     required.  He should monitor the area.  Follow-up as planned            Notes      New Medications      * Lisinopril/HCTZ (Zestoretic 10/12.5 MG*) 1 TAB TABLET: 1 TAB PO QDAY 30 Days       #30      Discontinued Medications      * Azithromycin Z-Jasson (Zithromax Z-Jasson) 250 MG TABLET: 250 MG PO ASDIR #1         Instructions: Take 500 mg (two tablets) by mouth the first day, then 250 mg        (one tablet) daily until all taken.            Patient Education      Patient Education Provided:  Yes            Topics Patient Counseled on      Simple Cyst--monitor for infection                 Disclaimer: Converted document may not contain table formatting or lab diagrams. Please see [employer] [facility] for the authenticated document.

## 2021-05-28 NOTE — PROGRESS NOTES
Patient: GUERDA WESTON     Acct: [id number]     Report: #[id number]  UNIT #: [id number]     : 1960    Encounter Date:2018  PRIMARY CARE: WALLACE RODRIGUEZ  ***Signed***  --------------------------------------------------------------------------------------------------------------------  NURSE INTAKE      Encounter Date      2018            Providers      Provider Not Found in Lookup:  WALLACE RODRIGUEZ            Visit Type      Established Patient Visit            Chief Complaint      LYMPHOMA            Allergies      Coded Allergies:             NO KNOWN DRUG ALLERGIES (Verified  Allergy, Unknown, 18)            Medications      Last Reconciled on 18 12:21 by KYLIE GARCIA MD      Fentanyl (Fentanyl-100*) 1 Each Patch.td72      100 MCG TRANSDERM Q72H@13, PATCH         Reported         18       Hydroxychloroquine Sulfate (Plaquenil*) 200 Mg Tab      400 MG PO BID, TAB         Reported         18       Metformin Hcl* (Metformin Hcl*) 500 Mg Tablet      500 MG PO BID, #60 TAB 0 Refills         Reported         18       Omeprazole (PriLOSEC*) 40 Mg Capsule.dr      40 MG PO HS for 30 Days, #30 CAP 0 Refills         Reported         18       Citalopram HBr (CeleXA) 40 Mg Tablet      40 MG PO HS, #30 TAB 0 Refills         Reported         18       Carbamazepine (TEGretol-XR*) 200 Mg Tab.er.12h      200 MG PO BID, #60 TAB.SR 0 Refills         Reported         18       Promethazine HCl (Phenergan*) 25 Mg Tablet      25 MG PO BID, #60 TAB 0 Refills         Reported         18       Hydrocodone/Acetaminophen 10/325 MG (Hydrocodone/Acetaminophen 10/325 MG) 1     Each Tablet      1 TAB PO Q4H Y for BREAKTHROUGH PAIN, TAB 0 Refills         Reported         18       Hctz/Spironolactone 25/25 Mg (Aldactazide 25/25 Mg) 1 Each Tablet      1 TAB PO QDAY, #30 TAB         Reported         18       LORazepam (LORazepam) 1 Mg Tablet      1 MG PO BID, TAB          Reported         6/6/18       Fenofibrate (Fenofibrate*) 48 Mg Tablet      48 MG PO QDAY, TAB         Reported         6/6/18      Medications Reviewed:  No Changes made to meds            PAST, FAMILY   Past Medical History      Past Medical History:  No Diabetes Type 1, Diabetes Type 2, No Thyroid Disease,     No COPD, No Emphysema, No Hypertension, No Stroke, No High Cholesterol, No     Heart Attack, No Bleeding Condition, No Low or High RBC Count, No Low or High     WBC Count, No Low or High Platelet Coun, No Hepatitis, No Kidney Disease,     Depression, No Alzheimer's Disease, No Mental Disease, No Seizures, Arthritis,     No Osteoporosis, No Osteopenia, No Short of Air, Sleep apnea, No Liver Disease,     No STD, No Enlarged Prostate, No Other      Hematology/oncology:  REPORTS HX OF: Lymphoma (B-CELL), DENIES HX OF: Previous     Treatment for CA, Anemia, Bladder Cancer, Blood cancer, Brain cancer, Breast     cancer, Coagulopathy, Colon Cancer, Colorectal cancer, Endocrine cancer, Eye     cancer, GI cancer,  cancer, Kidney cancer, Leukemia, Leukocytosis, Leukopenia    , Liver cancer, Lung cancer, Musculoskeletal cancer, Myeloma, Neurologic cancer    , Oral cancer, Pancreatic Cancer, Prostate cancer, Skin cancer, Stomach cancer,     Testicular cancer, Thrombocytopenia, Thyroid cancer, Other cancer history,     Other hematologic history      Genetic/metabolic:  DENIES HX OF: Cystic fibrosis, Down syndrome, Other genetic     history, Other metabolic history            Past Surgical History      REPORTS HX OF: Cholecystectomy, Joint replacement (BOTH KNEES), Biopsy, Other     Past Surgical Hx (ABSESS PART OF COLON OUT, NECK SURGERY, SLEEP APNEA SURGERY,     CHIN CUT OFF), DENIES HX OF: Cataract extraction, Thyroid surgery, Lung biopsy,     CABG surgery, Coronary stent, Valve replacement, Appendectomy, Splenectomy,     Bladder surgery, Nephrectomy, Frature repair, Skin cancer removal, Melanoma     excision, Spinal  surgery, Breast biopsy, Lumpectomy, Mastectomy, bilateral,     Mastectomy, right, Mastectomy, left, Hysterectomy, Peg Tube Placement, VAD     Placement            Family History      REPORTS HX OF: Lymphoma (MOTHER, BROTHER)            Social History      Lives independently:  No            Tobacco Use      Tobacco status:  Current every day smoker      Smoking packs/day:  2            Alcohol Use      Alcohol intake:  None            Substance Use      Substance use:  Denies use            HISTORY OF PRESENT ILLNESS      Interim History            Mr. montague is a very pleasant 58-year-old gentleman with diffuse large cell B-    cell lymphoma, stage IIIB diagnosed in November 2017. Patient has been treated     now with 5 cycles of CHOP plus Rituxan with which he was able to achieve a     complete remission. His most recent PET scan is unremarkable for any residual     uptake.       Unfortunately his course has been complicated by significant tissue necrosis     and ulceration of the right hand secondary to chemotherapy infiltration, most     likely related to anthracyclines. Patient continues to be very concerned about     dysfunction of his right hand, he is currently under the care of the wound     management at [facility] but is not satisfied with his recovery.      As far as lymphoma is concerned he is in complete remission and does not need     any further intervention at this stage even though he has received 5 of the     intended 6 cycles of chemoimmunotherapy.            Patient comes today after after being evaluated by Dr. Donovan who is a hand     surgeon at [facility] who is planning to do repair surgery and possibly     grafting.            History and Physical      Mr. house is a very pleasant 57-year-old gentleman who has been treated for     diffuse large cell lymphoma with CHOP plus Rituxan for a total of 5 cycles of     chemotherapy. Patient comes for a second opinion.      Mr. house  initially presented with night sweats and a mass in the left groin     when he underwent staging studies with CT scan followed by a PET scan. He was     found to have a hypermetabolic mass in the left groin and supraclavicular     regions. He was also having significant night sweats. He underwent biopsy of     the lymph node from the left inguinal area which was diagnostic of diffuse     large cell B-cell lymphoma.       Large B cells were positive for CD20 CD10 and BCL 6 but negative for BCL-2 and     mum 1 as well as cyclin D1, CD5 and CD 30. Ki-67 was about 60%.       Final pathology was diffuse large B-cell lymphoma CD10 positive germinal center     type.      The lymphoma was positive BCL- 6 and myc gene rearrangements.       Final staging was stage IIIB. Patient has been treated with R CHOP for a total     of 5 cycles of chemotherapy.       Unfortunately his course was complicated by significant chemotherapy     infiltration on his right hand with tissue necrosis and ulceration because of     which he has been referred for wound care at [facility].      Patient continues to be very concerned about the dysfunction of his right hand     because he has had some damage to the tendons and he has not able to use his     right hand.            Most Recent Lab Findings      Laboratory Tests      7/2/18 12:45            REVIEW OF SYSTEMS      General:  Complains of: Fatigue, Denies: Appetite change, Excessive sweating,     Fever, Night sweats, Weight gain, Weight loss, Other      Eyes:  Denies: Blurred vision, Corrective lenses, Diplopia, Eye irritation, Eye     pain, Eye redness, Spots in vision, Vision loss, Other      Ears, nose, mouth, throat:  Denies: Headache, Seizures, Visual Changes, Hearing     loss, Sinus Congestion, Hoarseness, Sore throat, Other      Cardiovascular:  Denies: Chest pain, Irregular heartbeat, Palpitations, Swollen     ankles/legs, Other      Respiratory:  Denies: Chest pain, Shortness  of Air, Productive cough, Coughing     blood, Other      Gastrointestinal:  Denies: Nausea, Vomiting, Problem swallowing, Frequent     heartburn, Constipation, Diarrhea, Tarry stools, Bloody stools, Unable to     control bowels, Other      Kidney/Bladder:  Denies: Painful Urination, Blood in Urine, Incontinence,     Frequent Urination, Decreased Urine Stream, Other      Musculoskeletal:  Denies: New Back pain, Leg Cramps, Painful Joints, Swollen     Joints, Muscle Pain, Muscle weakness, Other      Skin:  DENIES: Bruises Easily, Hair changes, Lesions/changes in moles, Nail     changes, Pigment changes, Rash, Other      Neurological:  Denies: Dizziness, Fainting, Numbness\Tingling, Paralysis,     Seizures, Other      Psychiatric:  Complains of: AAO X 3, Denies: Anxiety, Panic attacks, Depression    , Memory loss, Other      Endocrine:  DiabetesThyroid DisorderOsteoporosisEndocrine Other      Hematologic/lymphatic:  Denies: Bruising, Bleeding, Enlarged Lymph Nodes,     Recurrent infections, Other            VITAL SIGNS AND SCORES      Vitals      Height 5 ft 9.29 in / 176 cm      Weight 259 lbs 7.703 oz / 117.7 kg      BSA 2.45 m2      BMI 38.0 kg/m2      Temperature 98.4 F / 36.89 C - Temporal      Pulse 59      Respirations 16      Blood Pressure 137/77 Sitting, Left Arm      Pulse Oximetry 98%, RM AIR            Pain Score      Pain Assessment:           Experiencing any pain?:  No         Pain Intensity:  0            Fatigue Score               Experiencing any fatigue?:  Yes         Fatigue (0-10 scale):  7            EXAM      Vitals            Vital Signs              Date Time  Temp Pulse Resp B/P (MAP) Pulse Ox O2 Delivery O2 Flow Rate FiO2             7/2/18 11:52 97.8 60  139/66 96 ROOM AIR              General Appearance:  Alert, Oriented X3, Cooperative      Eyes:  Anicteric Sclerae, Moist Conjunctiva      HEENT:  Orophraynx clear, No Erythema, No Exudates      Neck:  Supple, Full ROM      Respiratory:   CTAB, No Diminished Breath      Abdomen\Gastro:  Soft, No NABS      Cardio:  RRR, No Murmur, No, Peripheral Edema      Skin:  Normal Temperature, No Induration      Psychiatric:  Appropriate Affect, Intact Judgement, AAO x3      Muscularskeletal:  Normal Gait and Station, Full ROM of extremeties      Extremities:  No Digital Cyanosis, No Digital Ischemia, Pedal Pulses Intact,     Pedal Pulses Symetrical, Normal Gait and station, Weakness, Other (tissue     necrosis/ulcer)      Lymphatic:  No Cervical, No Supraclavicular, No Infraclavicular, No Axillary,     No Inguinal, No Other            PREVENTION      Hx Influenza Vaccination:  No      Influenza Vaccine Declined:  Yes      2 or More Falls Past Year?:  No      Fall Past Year with Injury?:  No      Hx Pneumococcal Vaccination:  No      Encouraged to follow-up with:  PCP regarding preventative exams.      Chart initiated by      ARLENE REDDY MA            IMPRESSION/PLAN      Current Plan            Mr. montague is a very pleasant 58-year-old gentleman with diffuse large cell B-    cell lymphoma, stage IIIB diagnosed in November 2017.       Patient has been treated now with 5 cycles of CHOP plus Rituxan with which he     was able to achieve a complete remission. His most recent PET scan is     unremarkable for any residual uptake.       As far as lymphoma is concerned he is in complete remission and does not need     any further intervention at this stage even though he has received 5 of the     intended 6 cycles of chemoimmunotherapy.      Patient will continue with his care with transplant surgery.       He is at high risk for relapse of lymphoma because of which our plan is to     repeat CT scan of the chest abdomen and pelvis in 3 months prior to the next     visit.       His desire is to follow me at [facility] program.            Plan      Large B-cell lymphoma - C85.10            Notes      New Diagnostics      * Abd/Pel W/ Cont CT, SCHEDULED PROCEDURE       * ABDOMEN W/WO CONTRAST CT, SCHEDULED PROCEDURE       Dx: Large B-cell lymphoma - C85.10      * Chest W/ Cont CT, SCHEDULED PROCEDURE       Dx: Large B-cell lymphoma - C85.10            Patient Education      Patient Education Provided:  Yes                 Disclaimer: Converted document may not contain table formatting or lab diagrams. Please see [facility] for the authenticated document.

## 2021-06-02 RX ORDER — OMEPRAZOLE 40 MG/1
40 CAPSULE, DELAYED RELEASE ORAL DAILY
COMMUNITY

## 2021-06-02 RX ORDER — HYDROXYCHLOROQUINE SULFATE 200 MG/1
200 TABLET, FILM COATED ORAL DAILY
COMMUNITY

## 2021-06-02 RX ORDER — CITALOPRAM 40 MG/1
40 TABLET ORAL DAILY
COMMUNITY

## 2021-06-02 RX ORDER — LORAZEPAM 1 MG/1
1 TABLET ORAL EVERY 8 HOURS PRN
COMMUNITY

## 2021-06-02 RX ORDER — LISINOPRIL AND HYDROCHLOROTHIAZIDE 12.5; 1 MG/1; MG/1
1 TABLET ORAL DAILY
COMMUNITY

## 2021-06-02 RX ORDER — CARBAMAZEPINE 200 MG/1
200 TABLET, EXTENDED RELEASE ORAL 2 TIMES DAILY
COMMUNITY

## 2021-06-02 RX ORDER — HYDROCODONE BITARTRATE AND ACETAMINOPHEN 10; 325 MG/15ML; MG/15ML
10 SOLUTION ORAL EVERY 6 HOURS PRN
COMMUNITY

## 2021-06-02 RX ORDER — FENOFIBRATE 48 MG/1
48 TABLET, COATED ORAL DAILY
COMMUNITY
End: 2022-02-15 | Stop reason: SDUPTHER

## 2021-06-07 ENCOUNTER — APPOINTMENT (OUTPATIENT)
Dept: ONCOLOGY | Facility: HOSPITAL | Age: 61
End: 2021-06-07

## 2021-06-07 ENCOUNTER — TRANSCRIBE ORDERS (OUTPATIENT)
Dept: ADMINISTRATIVE | Facility: HOSPITAL | Age: 61
End: 2021-06-07

## 2021-06-07 DIAGNOSIS — C83.30 DIFFUSE LARGE B-CELL LYMPHOMA, UNSPECIFIED BODY REGION (HCC): Primary | ICD-10-CM

## 2021-06-15 ENCOUNTER — HOSPITAL ENCOUNTER (OUTPATIENT)
Dept: CT IMAGING | Facility: HOSPITAL | Age: 61
Discharge: HOME | End: 2021-06-15

## 2021-06-15 DIAGNOSIS — C83.30 DIFFUSE LARGE B-CELL LYMPHOMA, UNSPECIFIED BODY REGION (HCC): ICD-10-CM

## 2021-06-15 DIAGNOSIS — C83.38 DIFFUSE LARGE B-CELL LYMPHOMA OF LYMPH NODES OF MULTIPLE SITES (HCC): Primary | ICD-10-CM

## 2021-06-15 PROCEDURE — 70491 CT SOFT TISSUE NECK W/DYE: CPT

## 2021-06-15 PROCEDURE — 74177 CT ABD & PELVIS W/CONTRAST: CPT

## 2021-06-15 PROCEDURE — 25010000002 IOPAMIDOL 61 % SOLUTION: Performed by: INTERNAL MEDICINE

## 2021-06-15 PROCEDURE — 71260 CT THORAX DX C+: CPT

## 2021-06-15 RX ORDER — POTASSIUM CHLORIDE 750 MG/1
10 TABLET, FILM COATED, EXTENDED RELEASE ORAL 2 TIMES DAILY
COMMUNITY
Start: 2021-03-30

## 2021-06-15 RX ORDER — CYCLOBENZAPRINE HCL 10 MG
10 TABLET ORAL 3 TIMES DAILY
COMMUNITY
Start: 2021-06-01

## 2021-06-15 RX ORDER — OXYCODONE AND ACETAMINOPHEN 10; 325 MG/1; MG/1
TABLET ORAL
COMMUNITY
Start: 2021-05-18

## 2021-06-15 RX ORDER — PROMETHAZINE HYDROCHLORIDE 25 MG/1
25 TABLET ORAL EVERY 6 HOURS PRN
COMMUNITY
Start: 2021-06-07

## 2021-06-15 RX ORDER — FENOFIBRATE 160 MG/1
160 TABLET ORAL DAILY
COMMUNITY
Start: 2021-03-30

## 2021-06-15 RX ORDER — PREGABALIN 200 MG/1
200 CAPSULE ORAL 3 TIMES DAILY
COMMUNITY
Start: 2021-06-07

## 2021-06-15 RX ORDER — FLUTICASONE PROPIONATE 50 MCG
1 SPRAY, SUSPENSION (ML) NASAL DAILY
COMMUNITY
Start: 2021-04-30

## 2021-06-15 RX ORDER — LANCETS 30 GAUGE
EACH MISCELLANEOUS
COMMUNITY
Start: 2021-05-04

## 2021-06-15 RX ORDER — BLOOD SUGAR DIAGNOSTIC
STRIP MISCELLANEOUS
COMMUNITY
Start: 2021-05-04

## 2021-06-15 RX ORDER — INSULIN DEGLUDEC 200 U/ML
INJECTION, SOLUTION SUBCUTANEOUS
COMMUNITY
Start: 2021-05-04

## 2021-06-15 RX ORDER — COLCHICINE 0.6 MG/1
TABLET ORAL
COMMUNITY
Start: 2021-06-13

## 2021-06-15 RX ADMIN — IOPAMIDOL 150 ML: 612 INJECTION, SOLUTION INTRAVENOUS at 09:51

## 2021-06-16 ENCOUNTER — LAB (OUTPATIENT)
Dept: ONCOLOGY | Facility: HOSPITAL | Age: 61
End: 2021-06-16

## 2021-06-16 ENCOUNTER — OFFICE VISIT (OUTPATIENT)
Dept: ONCOLOGY | Facility: HOSPITAL | Age: 61
End: 2021-06-16

## 2021-06-16 VITALS
DIASTOLIC BLOOD PRESSURE: 92 MMHG | TEMPERATURE: 97.8 F | WEIGHT: 291.01 LBS | OXYGEN SATURATION: 96 % | BODY MASS INDEX: 42.61 KG/M2 | RESPIRATION RATE: 24 BRPM | HEART RATE: 68 BPM | SYSTOLIC BLOOD PRESSURE: 184 MMHG

## 2021-06-16 DIAGNOSIS — C83.30 DIFFUSE LARGE B-CELL LYMPHOMA, UNSPECIFIED BODY REGION (HCC): ICD-10-CM

## 2021-06-16 DIAGNOSIS — C83.30 DIFFUSE LARGE B-CELL LYMPHOMA, UNSPECIFIED BODY REGION (HCC): Primary | ICD-10-CM

## 2021-06-16 PROBLEM — C83.38 DIFFUSE LARGE B-CELL LYMPHOMA OF LYMPH NODES OF MULTIPLE SITES (HCC): Status: ACTIVE | Noted: 2018-10-05

## 2021-06-16 LAB
ALBUMIN SERPL-MCNC: 4.2 G/DL (ref 3.5–5.2)
ALBUMIN/GLOB SERPL: 1.6 G/DL
ALP SERPL-CCNC: 74 U/L (ref 39–117)
ALT SERPL W P-5'-P-CCNC: 15 U/L (ref 1–41)
ANION GAP SERPL CALCULATED.3IONS-SCNC: 9 MMOL/L (ref 5–15)
AST SERPL-CCNC: 25 U/L (ref 1–40)
BASOPHILS # BLD AUTO: 0.07 10*3/MM3 (ref 0–0.2)
BASOPHILS NFR BLD AUTO: 1 % (ref 0–1.5)
BILIRUB SERPL-MCNC: 0.2 MG/DL (ref 0–1.2)
BUN SERPL-MCNC: 15 MG/DL (ref 8–23)
BUN/CREAT SERPL: 8.6 (ref 7–25)
CALCIUM SPEC-SCNC: 8.7 MG/DL (ref 8.6–10.5)
CHLORIDE SERPL-SCNC: 104 MMOL/L (ref 98–107)
CO2 SERPL-SCNC: 29 MMOL/L (ref 22–29)
CREAT SERPL-MCNC: 1.75 MG/DL (ref 0.76–1.27)
DEPRECATED RDW RBC AUTO: 49.4 FL (ref 37–54)
EOSINOPHIL # BLD AUTO: 0.43 10*3/MM3 (ref 0–0.4)
EOSINOPHIL NFR BLD AUTO: 6.2 % (ref 0.3–6.2)
ERYTHROCYTE [DISTWIDTH] IN BLOOD BY AUTOMATED COUNT: 14.6 % (ref 12.3–15.4)
GFR SERPL CREATININE-BSD FRML MDRD: 40 ML/MIN/1.73
GLOBULIN UR ELPH-MCNC: 2.6 GM/DL
GLUCOSE SERPL-MCNC: 176 MG/DL (ref 65–99)
HCT VFR BLD AUTO: 40.5 % (ref 37.5–51)
HGB BLD-MCNC: 13 G/DL (ref 13–17.7)
IMM GRANULOCYTES # BLD AUTO: 0.06 10*3/MM3 (ref 0–0.05)
IMM GRANULOCYTES NFR BLD AUTO: 0.9 % (ref 0–0.5)
LDH SERPL-CCNC: 241 U/L (ref 135–225)
LYMPHOCYTES # BLD AUTO: 1.23 10*3/MM3 (ref 0.7–3.1)
LYMPHOCYTES NFR BLD AUTO: 17.7 % (ref 19.6–45.3)
MCH RBC QN AUTO: 29.5 PG (ref 26.6–33)
MCHC RBC AUTO-ENTMCNC: 32.1 G/DL (ref 31.5–35.7)
MCV RBC AUTO: 92 FL (ref 79–97)
MONOCYTES # BLD AUTO: 0.54 10*3/MM3 (ref 0.1–0.9)
MONOCYTES NFR BLD AUTO: 7.8 % (ref 5–12)
NEUTROPHILS NFR BLD AUTO: 4.61 10*3/MM3 (ref 1.7–7)
NEUTROPHILS NFR BLD AUTO: 66.4 % (ref 42.7–76)
NRBC BLD AUTO-RTO: 0 /100 WBC (ref 0–0.2)
PLATELET # BLD AUTO: 213 10*3/MM3 (ref 140–450)
PMV BLD AUTO: 10.7 FL (ref 6–12)
POTASSIUM SERPL-SCNC: 4.5 MMOL/L (ref 3.5–5.2)
PROT SERPL-MCNC: 6.8 G/DL (ref 6–8.5)
RBC # BLD AUTO: 4.4 10*6/MM3 (ref 4.14–5.8)
SODIUM SERPL-SCNC: 142 MMOL/L (ref 136–145)
WBC # BLD AUTO: 6.94 10*3/MM3 (ref 3.4–10.8)

## 2021-06-16 PROCEDURE — 83615 LACTATE (LD) (LDH) ENZYME: CPT | Performed by: INTERNAL MEDICINE

## 2021-06-16 PROCEDURE — G0463 HOSPITAL OUTPT CLINIC VISIT: HCPCS | Performed by: INTERNAL MEDICINE

## 2021-06-16 PROCEDURE — 85025 COMPLETE CBC W/AUTO DIFF WBC: CPT | Performed by: INTERNAL MEDICINE

## 2021-06-16 PROCEDURE — 36415 COLL VENOUS BLD VENIPUNCTURE: CPT

## 2021-06-16 PROCEDURE — 99213 OFFICE O/P EST LOW 20 MIN: CPT | Performed by: INTERNAL MEDICINE

## 2021-06-16 PROCEDURE — 80053 COMPREHEN METABOLIC PANEL: CPT | Performed by: INTERNAL MEDICINE

## 2021-06-16 RX ORDER — GLIPIZIDE 5 MG/1
5 TABLET, FILM COATED, EXTENDED RELEASE ORAL 2 TIMES DAILY WITH MEALS
COMMUNITY
Start: 2021-06-10

## 2021-06-16 NOTE — PROGRESS NOTES
Chief Complaint  b cell lymphoma, Follow-up, and Results    Mirza Cheung MD Douglas, James Todd, MD    Subjective          Maurice Hughes presents to [facility] for continued follow-up of his diffuse large B-cell lymphoma.  He is status post R-CHOP chemotherapy completed 4/18-5 of 6 planned cycles with the last cycle deferred secondary to extravasation.  On return today, he states he is feeling well.  He denies any fever, chills, weight loss, night sweats or adenopathy.  He reports good appetite his weight is maintained.  He has good energy level.  He denies excessive bruising or bleeding.  He denies chest pain, shortness of breath, exertional dyspnea or lower extremity swelling.    Oncology/Hematology History    No history exists.       Review of Systems   Constitutional: Negative for appetite change, diaphoresis, fatigue, fever, unexpected weight gain and unexpected weight loss.   HENT: Negative for hearing loss, mouth sores, sore throat, swollen glands, trouble swallowing and voice change.    Eyes: Negative for blurred vision.   Respiratory: Positive for shortness of breath. Negative for cough and wheezing.    Cardiovascular: Negative for chest pain and palpitations.   Gastrointestinal: Negative for abdominal pain, blood in stool, constipation, diarrhea, nausea and vomiting.   Endocrine: Negative for cold intolerance and heat intolerance.   Genitourinary: Negative for difficulty urinating, dysuria, frequency, hematuria and urinary incontinence.   Musculoskeletal: Negative for arthralgias, back pain and myalgias.   Skin: Negative for rash, skin lesions and bruise.   Neurological: Negative for dizziness, seizures, weakness, numbness and headache.   Hematological: Does not bruise/bleed easily.   Psychiatric/Behavioral: Negative for depressed mood. The patient is not nervous/anxious.      Current Outpatient Medications on File Prior to Visit   Medication Sig Dispense  Refill   • carBAMazepine XR (TEGretol  XR) 200 MG 12 hr tablet Take 200 mg by mouth 2 (Two) Times a Day.     • citalopram (CeleXA) 40 MG tablet Take 40 mg by mouth Daily.     • colchicine 0.6 MG tablet      • cyclobenzaprine (FLEXERIL) 10 MG tablet Take 10 mg by mouth 3 (Three) Times a Day.     • fenofibrate (TRICOR) 48 MG tablet Take 48 mg by mouth Daily.     • fenofibrate 160 MG tablet Take 160 mg by mouth Daily. with food     • fluticasone (FLONASE) 50 MCG/ACT nasal spray 1 spray by Each Nare route Daily.     • glipizide (GLUCOTROL XL) 5 MG ER tablet Take 5 mg by mouth 2 (Two) Times a Day With Meals.     • HYDROcodone-Acetaminophen (ZAMCET)  MG/15ML solution Take 10 mg of hydrocodone by mouth Every 6 (Six) Hours As Needed.     • hydroxychloroquine (PLAQUENIL) 200 MG tablet Take 200 mg by mouth Daily.     • Lancets (OneTouch Delica Plus [id number]) misc USE TO TEST BLOOD SUGAR ONCE DAILY AS DIRECTED     • lisinopril-hydrochlorothiazide (PRINZIDE,ZESTORETIC) 10-12.5 MG per tablet Take 1 tablet by mouth Daily.     • LORazepam (ATIVAN) 1 MG tablet Take 1 mg by mouth Every 8 (Eight) Hours As Needed.     • metFORMIN HCl  MG/5ML Suspension Reconstituted ER Take 500 mg by mouth 2 (two) times a day.     • omeprazole (priLOSEC) 40 MG capsule Take 40 mg by mouth Daily.     • OneTouch Verio test strip USE TO TEST BLOOD SUGAR 3 TIMES DAILY     • oxyCODONE-acetaminophen (PERCOCET)  MG per tablet TAKE 1 TABLET BY MOUTH EVERY 6 HOURS (DO NOT FILL UNTIL 5/17/21)     • potassium chloride 10 MEQ CR tablet Take 10 mEq by mouth 2 (Two) Times a Day.     • pregabalin (LYRICA) 200 MG capsule Take 200 mg by mouth 3 (Three) Times a Day.     • promethazine (PHENERGAN) 25 MG tablet Take 25 mg by mouth Every 6 (Six) Hours As Needed. for nausea     • Promethazine HCl (PHENERGAN PO) Take 25 mg by mouth.     • Tresiba FlexTouch 200 UNIT/ML solution pen-injector pen injection INJECT 30 UNITS SUBCUTANEOUS DAILY AS DIRECTED        No current facility-administered medications on file prior to visit.       No Known Allergies  Past Medical History:   Diagnosis Date   • Arthritis    • COPD (chronic obstructive pulmonary disease) (CMS/HCC)    • Depression    • Diabetes mellitus (CMS/HCC)     type 2   • Fatigue    • Lymphoma (CMS/HCC)     B CELL   • Sleep apnea      Past Surgical History:   Procedure Laterality Date   • BIOPSY ABDOMINAL MASS     • CHOLECYSTECTOMY     • JOINT REPLACEMENT Bilateral      Social History     Socioeconomic History   • Marital status:      Spouse name: Not on file   • Number of children: Not on file   • Years of education: Not on file   • Highest education level: Not on file   Tobacco Use   • Smoking status: Current Every Day Smoker     Packs/day: 1.00   Substance and Sexual Activity   • Drug use: Not Currently     Family History   Problem Relation Age of Onset   • Lymphoma Mother    • Lymphoma Brother        Objective   Physical Exam  Vitals and nursing note reviewed.   Constitutional:       General: He is not in acute distress.     Appearance: Normal appearance.   HENT:      Head: Normocephalic and atraumatic.   Eyes:      General: No scleral icterus.     Conjunctiva/sclera: Conjunctivae normal.      Pupils: Pupils are equal, round, and reactive to light.   Cardiovascular:      Rate and Rhythm: Normal rate and regular rhythm.      Pulses: Normal pulses.      Heart sounds: Normal heart sounds. No murmur heard.   No gallop.    Pulmonary:      Effort: Pulmonary effort is normal.      Breath sounds: Normal breath sounds. No decreased breath sounds, wheezing, rhonchi or rales.   Abdominal:      General: Abdomen is flat. Bowel sounds are normal. There is no distension.      Palpations: Abdomen is soft.      Tenderness: There is no abdominal tenderness. There is no guarding or rebound.      Hernia: No hernia is present.   Musculoskeletal:         General: Normal range of motion.      Cervical back: Neck supple. No  tenderness.   Lymphadenopathy:      Head:      Right side of head: No submental, submandibular, preauricular, posterior auricular or occipital adenopathy.      Left side of head: No submental, submandibular, preauricular, posterior auricular or occipital adenopathy.      Cervical: No cervical adenopathy.      Right cervical: No superficial, deep or posterior cervical adenopathy.     Left cervical: No superficial, deep or posterior cervical adenopathy.      Upper Body:      Right upper body: No supraclavicular, axillary or epitrochlear adenopathy.      Left upper body: No supraclavicular, axillary or epitrochlear adenopathy.   Skin:     General: Skin is warm and dry.      Coloration: Skin is not jaundiced or pale.      Findings: No bruising, lesion or rash.   Neurological:      General: No focal deficit present.      Mental Status: He is alert and oriented to person, place, and time. Mental status is at baseline.      Cranial Nerves: Cranial nerves are intact.   Psychiatric:         Attention and Perception: Attention normal.         Mood and Affect: Mood normal.         Behavior: Behavior normal. Behavior is cooperative.         Vitals:    06/16/21 1511   BP: (!) 184/92   Pulse: 68   Resp: 24   Temp: 97.8 °F (36.6 °C)   SpO2: 96%   Weight: 132 kg (291 lb 0.1 oz)   PainSc:   4   PainLoc: Knee     ECOG score: 0         PHQ-9 Total Score: 0                  Result Review :   The following data was reviewed by: John Fuentes MD on 06/16/2021:  Lab Results   Component Value Date    HGB 13.0 06/16/2021    HCT 40.5 06/16/2021    MCV 92.0 06/16/2021     06/16/2021    WBC 6.94 06/16/2021    NEUTROABS 4.61 06/16/2021    LYMPHSABS 1.23 06/16/2021    MONOSABS 0.54 06/16/2021    EOSABS 0.43 (H) 06/16/2021    BASOSABS 0.07 06/16/2021     Lab Results   Component Value Date    BUN 15 12/29/2020    CREATININE 1.70 (H) 12/29/2020     12/29/2020    K 4.7 12/29/2020    CL 98 (L) 12/29/2020    CO2 30 12/29/2020     CALCIUM 9.1 12/29/2020    PROTEINTOT 6.7 12/29/2020    ALBUMIN 4.1 12/29/2020    BILITOT 0.21 12/29/2020    ALKPHOS 110 12/29/2020    AST 16 12/29/2020    ALT 15 12/29/2020     Data reviewed: Radiologic studies CT scan of chest, abdomen, pelvis, neck demonstrating no new adenopathy.  1.7 cm mesenteric lymph nodes stable.      Assessment and Plan    Diagnoses and all orders for this visit:    1. Diffuse large B-cell lymphoma, unspecified body region (CMS/HCC) (Primary)  -     CBC & Differential; Future  -     Comprehensive Metabolic Panel; Future  -     Lactate Dehydrogenase; Future  -     CT soft tissue neck w contrast; Future  -     CT chest w contrast; Future  -     CT abdomen pelvis w contrast; Future  -     CBC & Differential; Future  -     Comprehensive Metabolic Panel; Future  -     Lactate Dehydrogenase; Future    Status post R-CHOP chemotherapy in 2018.  Patient is doing well.  I see no evidence of disease recurrence by history, physical examination or recent scans.  Labs today are pending.  I will call him with results.  I will plan to see him back in 6 months for ongoing surveillance with labs and scans prior.          Patient Follow Up: 6 months with labs and scans prior patient was given instructions and counseling regarding his condition or for health maintenance advice. Please see specific information pulled into the AVS if appropriate.     John Fuentes MD    6/16/2021

## 2021-06-16 NOTE — ASSESSMENT & PLAN NOTE
Status post R-CHOP chemotherapy in 2018.  Patient is doing well.  I see no evidence of disease recurrence by history, physical examination or recent scans.  Labs today are pending.  I will call him with results.  I will plan to see him back in 6 months for ongoing surveillance with labs and scans prior.

## 2021-12-13 ENCOUNTER — APPOINTMENT (OUTPATIENT)
Dept: CT IMAGING | Facility: HOSPITAL | Age: 61
End: 2021-12-13

## 2021-12-30 ENCOUNTER — APPOINTMENT (OUTPATIENT)
Dept: CT IMAGING | Facility: HOSPITAL | Age: 61
End: 2021-12-30

## 2022-01-17 ENCOUNTER — APPOINTMENT (OUTPATIENT)
Dept: ONCOLOGY | Facility: HOSPITAL | Age: 62
End: 2022-01-17

## 2022-02-11 ENCOUNTER — HOSPITAL ENCOUNTER (OUTPATIENT)
Dept: CT IMAGING | Facility: HOSPITAL | Age: 62
Discharge: HOME | End: 2022-02-11

## 2022-02-11 ENCOUNTER — LAB (OUTPATIENT)
Dept: LAB | Facility: HOSPITAL | Age: 62
End: 2022-02-11

## 2022-02-11 DIAGNOSIS — C83.30 DIFFUSE LARGE B-CELL LYMPHOMA, UNSPECIFIED BODY REGION: ICD-10-CM

## 2022-02-11 LAB
ALBUMIN SERPL-MCNC: 4.8 G/DL (ref 3.5–5.2)
ALBUMIN/GLOB SERPL: 1.8 G/DL
ALP SERPL-CCNC: 90 U/L (ref 39–117)
ALT SERPL W P-5'-P-CCNC: 15 U/L (ref 1–41)
ANION GAP SERPL CALCULATED.3IONS-SCNC: 12.1 MMOL/L (ref 5–15)
AST SERPL-CCNC: 22 U/L (ref 1–40)
BASOPHILS # BLD AUTO: 0.1 10*3/MM3 (ref 0–0.2)
BASOPHILS NFR BLD AUTO: 0.8 % (ref 0–1.5)
BILIRUB SERPL-MCNC: 0.3 MG/DL (ref 0–1.2)
BUN SERPL-MCNC: 19 MG/DL (ref 8–23)
BUN/CREAT SERPL: 9.7 (ref 7–25)
CALCIUM SPEC-SCNC: 9.5 MG/DL (ref 8.6–10.5)
CHLORIDE SERPL-SCNC: 99 MMOL/L (ref 98–107)
CO2 SERPL-SCNC: 27.9 MMOL/L (ref 22–29)
CREAT BLDA-MCNC: 2 MG/DL
CREAT SERPL-MCNC: 1.95 MG/DL (ref 0.76–1.27)
DEPRECATED RDW RBC AUTO: 48.9 FL (ref 37–54)
EOSINOPHIL # BLD AUTO: 0.53 10*3/MM3 (ref 0–0.4)
EOSINOPHIL NFR BLD AUTO: 4.3 % (ref 0.3–6.2)
ERYTHROCYTE [DISTWIDTH] IN BLOOD BY AUTOMATED COUNT: 14.3 % (ref 12.3–15.4)
GFR SERPL CREATININE-BSD FRML MDRD: 35 ML/MIN/1.73
GLOBULIN UR ELPH-MCNC: 2.6 GM/DL
GLUCOSE SERPL-MCNC: 80 MG/DL (ref 65–99)
HCT VFR BLD AUTO: 41.4 % (ref 37.5–51)
HGB BLD-MCNC: 13.8 G/DL (ref 13–17.7)
IMM GRANULOCYTES # BLD AUTO: 0.11 10*3/MM3 (ref 0–0.05)
IMM GRANULOCYTES NFR BLD AUTO: 0.9 % (ref 0–0.5)
LDH SERPL-CCNC: 228 U/L (ref 135–225)
LYMPHOCYTES # BLD AUTO: 2.81 10*3/MM3 (ref 0.7–3.1)
LYMPHOCYTES NFR BLD AUTO: 22.8 % (ref 19.6–45.3)
MCH RBC QN AUTO: 31.2 PG (ref 26.6–33)
MCHC RBC AUTO-ENTMCNC: 33.3 G/DL (ref 31.5–35.7)
MCV RBC AUTO: 93.7 FL (ref 79–97)
MONOCYTES # BLD AUTO: 0.94 10*3/MM3 (ref 0.1–0.9)
MONOCYTES NFR BLD AUTO: 7.6 % (ref 5–12)
NEUTROPHILS NFR BLD AUTO: 63.6 % (ref 42.7–76)
NEUTROPHILS NFR BLD AUTO: 7.82 10*3/MM3 (ref 1.7–7)
NRBC BLD AUTO-RTO: 0 /100 WBC (ref 0–0.2)
PLATELET # BLD AUTO: 259 10*3/MM3 (ref 140–450)
PMV BLD AUTO: 10.5 FL (ref 6–12)
POTASSIUM SERPL-SCNC: 5.2 MMOL/L (ref 3.5–5.2)
PROT SERPL-MCNC: 7.4 G/DL (ref 6–8.5)
RBC # BLD AUTO: 4.42 10*6/MM3 (ref 4.14–5.8)
SODIUM SERPL-SCNC: 139 MMOL/L (ref 136–145)
WBC NRBC COR # BLD: 12.31 10*3/MM3 (ref 3.4–10.8)

## 2022-02-11 PROCEDURE — 82565 ASSAY OF CREATININE: CPT

## 2022-02-11 PROCEDURE — 85025 COMPLETE CBC W/AUTO DIFF WBC: CPT

## 2022-02-11 PROCEDURE — 74177 CT ABD & PELVIS W/CONTRAST: CPT

## 2022-02-11 PROCEDURE — 71260 CT THORAX DX C+: CPT

## 2022-02-11 PROCEDURE — 83615 LACTATE (LD) (LDH) ENZYME: CPT

## 2022-02-11 PROCEDURE — 80053 COMPREHEN METABOLIC PANEL: CPT

## 2022-02-11 PROCEDURE — 70491 CT SOFT TISSUE NECK W/DYE: CPT

## 2022-02-11 PROCEDURE — 0 IOPAMIDOL PER 1 ML: Performed by: INTERNAL MEDICINE

## 2022-02-11 RX ADMIN — IOPAMIDOL 150 ML: 755 INJECTION, SOLUTION INTRAVENOUS at 15:46

## 2022-02-15 ENCOUNTER — OFFICE VISIT (OUTPATIENT)
Dept: ONCOLOGY | Facility: HOSPITAL | Age: 62
End: 2022-02-15

## 2022-02-15 VITALS
SYSTOLIC BLOOD PRESSURE: 190 MMHG | HEART RATE: 94 BPM | WEIGHT: 273.37 LBS | RESPIRATION RATE: 20 BRPM | BODY MASS INDEX: 40.03 KG/M2 | DIASTOLIC BLOOD PRESSURE: 83 MMHG | TEMPERATURE: 98.8 F | OXYGEN SATURATION: 94 %

## 2022-02-15 DIAGNOSIS — C83.38 DIFFUSE LARGE B-CELL LYMPHOMA OF LYMPH NODES OF MULTIPLE SITES: Primary | ICD-10-CM

## 2022-02-15 PROBLEM — Z12.11 SCREENING FOR MALIGNANT NEOPLASM OF COLON: Status: ACTIVE | Noted: 2022-02-15

## 2022-02-15 PROBLEM — R07.9 CHEST PAIN: Status: ACTIVE | Noted: 2022-02-15

## 2022-02-15 PROBLEM — K59.03 DRUG-INDUCED CONSTIPATION: Status: ACTIVE | Noted: 2022-02-15

## 2022-02-15 PROBLEM — K21.9 GASTRO-ESOPHAGEAL REFLUX DISEASE WITHOUT ESOPHAGITIS: Status: ACTIVE | Noted: 2022-02-15

## 2022-02-15 PROCEDURE — G0463 HOSPITAL OUTPT CLINIC VISIT: HCPCS | Performed by: INTERNAL MEDICINE

## 2022-02-15 PROCEDURE — G0463 HOSPITAL OUTPT CLINIC VISIT: HCPCS

## 2022-02-15 PROCEDURE — 99213 OFFICE O/P EST LOW 20 MIN: CPT | Performed by: INTERNAL MEDICINE

## 2022-02-15 RX ORDER — CYCLOBENZAPRINE HCL 10 MG
TABLET ORAL EVERY 24 HOURS
COMMUNITY
End: 2022-02-15 | Stop reason: SDUPTHER

## 2022-02-15 RX ORDER — TAMSULOSIN HYDROCHLORIDE 0.4 MG/1
1 CAPSULE ORAL DAILY
COMMUNITY
Start: 2021-12-24

## 2022-02-15 RX ORDER — ROPINIROLE 5 MG/1
TABLET, FILM COATED ORAL
COMMUNITY
Start: 2022-01-10

## 2022-02-15 RX ORDER — ALBUTEROL SULFATE 90 UG/1
AEROSOL, METERED RESPIRATORY (INHALATION)
COMMUNITY

## 2022-02-15 RX ORDER — LEVOCETIRIZINE DIHYDROCHLORIDE 5 MG/1
5 TABLET, FILM COATED ORAL EVERY EVENING
COMMUNITY
Start: 2022-01-20

## 2022-02-15 RX ORDER — GABAPENTIN 800 MG/1
800 TABLET ORAL 4 TIMES DAILY
COMMUNITY
Start: 2021-12-13

## 2022-02-15 RX ORDER — MEDICAL SUPPLY, MISCELLANEOUS
EACH MISCELLANEOUS
COMMUNITY
Start: 2021-12-16

## 2022-02-15 NOTE — PROGRESS NOTES
Chief Complaint  Lymphoma and Follow-up    Mirza Cheung MD Douglas, James Todd, MD Subjective          Maurice Hughes presents to [facility] for follow-up of diffuse large B-cell lymphoma.  He is status post treatment as outlined.  On return day, he states he is feeling well.  Denies fever, chills, night sweats or adenopathy.  He notes good appetite and his weight is maintained.  He has adequate energy level.  He does get a little winded with heavier exertion but is not exercising.    Oncology/Hematology History Overview Note   4/2018 R-CHOP chemotherapy completed 4/18-5 of 6 planned cycles with the last cycle deferred secondary to extravasation     Diffuse large B-cell lymphoma of lymph nodes of multiple sites (HCC)   6/15/2021 Initial Diagnosis    Diffuse large B-cell lymphoma of lymph nodes of multiple sites (HCC)         Review of Systems   Constitutional: Negative for appetite change, diaphoresis, fatigue, fever, unexpected weight gain and unexpected weight loss.   HENT: Negative for hearing loss, mouth sores, sore throat, swollen glands, trouble swallowing and voice change.    Eyes: Negative for blurred vision.   Respiratory: Positive for shortness of breath. Negative for cough and wheezing.    Cardiovascular: Negative for chest pain and palpitations.   Gastrointestinal: Negative for abdominal pain, blood in stool, constipation, diarrhea, nausea and vomiting.   Endocrine: Negative for cold intolerance and heat intolerance.   Genitourinary: Negative for difficulty urinating, dysuria, frequency, hematuria and urinary incontinence.   Musculoskeletal: Positive for back pain. Negative for arthralgias and myalgias.   Skin: Negative for rash, skin lesions and wound.   Neurological: Negative for dizziness, seizures, weakness, numbness and headache.   Hematological: Does not bruise/bleed easily.   Psychiatric/Behavioral: Negative for depressed mood. The patient is not  nervous/anxious.      Current Outpatient Medications on File Prior to Visit   Medication Sig Dispense Refill   • albuterol sulfate  (90 Base) MCG/ACT inhaler Every 4 (Four) Hours.     • carBAMazepine XR (TEGretol  XR) 200 MG 12 hr tablet Take 200 mg by mouth 2 (Two) Times a Day.     • citalopram (CeleXA) 40 MG tablet Take 40 mg by mouth Daily.     • colchicine 0.6 MG tablet      • cyclobenzaprine (FLEXERIL) 10 MG tablet Take 10 mg by mouth 3 (Three) Times a Day.     • fenofibrate 160 MG tablet Take 160 mg by mouth Daily. with food     • fluticasone (FLONASE) 50 MCG/ACT nasal spray 1 spray by Each Nare route Daily.     • gabapentin (NEURONTIN) 800 MG tablet Take 800 mg by mouth 4 (Four) Times a Day.     • glipizide (GLUCOTROL XL) 5 MG ER tablet Take 5 mg by mouth 2 (Two) Times a Day With Meals.     • HYDROcodone-Acetaminophen (ZAMCET)  MG/15ML solution Take 10 mg of hydrocodone by mouth Every 6 (Six) Hours As Needed.     • hydroxychloroquine (PLAQUENIL) 200 MG tablet Take 200 mg by mouth Daily.     • Lancets (OneTouch Delica Plus [id number]) misc USE TO TEST BLOOD SUGAR ONCE DAILY AS DIRECTED     • levocetirizine (XYZAL) 5 MG tablet Take 5 mg by mouth Every Evening.     • lisinopril-hydrochlorothiazide (PRINZIDE,ZESTORETIC) 10-12.5 MG per tablet Take 1 tablet by mouth Daily.     • LORazepam (ATIVAN) 1 MG tablet Take 1 mg by mouth Every 8 (Eight) Hours As Needed.     • metFORMIN HCl  MG/5ML Suspension Reconstituted ER Take 500 mg by mouth 2 (two) times a day.     • omeprazole (priLOSEC) 40 MG capsule Take 40 mg by mouth Daily.     • OneTouch Verio test strip USE TO TEST BLOOD SUGAR 3 TIMES DAILY     • oxyCODONE-acetaminophen (PERCOCET)  MG per tablet TAKE 1 TABLET BY MOUTH EVERY 6 HOURS (DO NOT FILL UNTIL 5/17/21)     • potassium chloride 10 MEQ CR tablet Take 10 mEq by mouth 2 (Two) Times a Day.     • pregabalin (LYRICA) 200 MG capsule Take 200 mg by mouth 3 (Three) Times a Day.     •  promethazine (PHENERGAN) 25 MG tablet Take 25 mg by mouth Every 6 (Six) Hours As Needed. for nausea     • rOPINIRole (REQUIP) 5 MG tablet TAKE 1 TABLET BY MOUTH 3 HOURS BEFORE BEDTIME ONCE DAILY FOR RESTLESS LEGS     • Stool Softener Plus Laxative 8.6-50 MG per tablet TAKE 2 TABLETS BY MOUTH IN THE EVENING AS NEEDED     • tamsulosin (FLOMAX) 0.4 MG capsule 24 hr capsule Take 1 capsule by mouth Daily.     • Tresiba FlexTouch 200 UNIT/ML solution pen-injector pen injection INJECT 30 UNITS SUBCUTANEOUS DAILY AS DIRECTED     • [DISCONTINUED] cyclobenzaprine (FLEXERIL) 10 MG tablet Daily.     • [DISCONTINUED] fenofibrate (TRICOR) 48 MG tablet Take 48 mg by mouth Daily.     • [DISCONTINUED] Promethazine HCl (PHENERGAN PO) Take 25 mg by mouth.       No current facility-administered medications on file prior to visit.       No Known Allergies  Past Medical History:   Diagnosis Date   • Arthritis    • COPD (chronic obstructive pulmonary disease) (HCC)    • Depression    • Diabetes mellitus (HCC)     type 2   • Fatigue    • Lymphoma (HCC)     B CELL   • Sleep apnea      Past Surgical History:   Procedure Laterality Date   • BIOPSY ABDOMINAL MASS     • CHOLECYSTECTOMY     • JOINT REPLACEMENT Bilateral      Social History     Socioeconomic History   • Marital status:    Tobacco Use   • Smoking status: Current Every Day Smoker     Packs/day: 1.00   Substance and Sexual Activity   • Drug use: Not Currently     Family History   Problem Relation Age of Onset   • Lymphoma Mother    • Lymphoma Brother        Objective   Physical Exam  Vitals reviewed. Exam conducted with a chaperone present.   Constitutional:       Appearance: Normal appearance.   Cardiovascular:      Rate and Rhythm: Normal rate and regular rhythm.      Heart sounds: Normal heart sounds. No murmur heard.  No gallop.    Pulmonary:      Effort: Pulmonary effort is normal.      Breath sounds: Normal breath sounds.   Chest:   Breasts:      Right: No axillary  adenopathy or supraclavicular adenopathy.      Left: No axillary adenopathy or supraclavicular adenopathy.       Abdominal:      General: Abdomen is flat. Bowel sounds are normal.      Palpations: Abdomen is soft.   Musculoskeletal:      Cervical back: Neck supple.      Right lower leg: No edema.      Left lower leg: No edema.   Lymphadenopathy:      Head:      Right side of head: No preauricular, posterior auricular or occipital adenopathy.      Left side of head: No preauricular, posterior auricular or occipital adenopathy.      Cervical: No cervical adenopathy.      Upper Body:      Right upper body: No supraclavicular, axillary or epitrochlear adenopathy.      Left upper body: No supraclavicular, axillary or epitrochlear adenopathy.   Neurological:      Mental Status: He is alert and oriented to person, place, and time.   Psychiatric:         Mood and Affect: Mood normal.         Behavior: Behavior normal.         Vitals:    02/15/22 1456   BP: (!) 190/83   Pulse: 94   Resp: 20   Temp: 98.8 °F (37.1 °C)   SpO2: 94%   Weight: 124 kg (273 lb 5.9 oz)   PainSc:   5   PainLoc: Generalized     ECOG score: 1         PHQ-9 Total Score:                    Result Review :   The following data was reviewed by: John Fuentes MD on 02/15/2022:  Lab Results   Component Value Date    HGB 13.8 02/11/2022    HCT 41.4 02/11/2022    MCV 93.7 02/11/2022     02/11/2022    WBC 12.31 (H) 02/11/2022    NEUTROABS 7.82 (H) 02/11/2022    LYMPHSABS 2.81 02/11/2022    MONOSABS 0.94 (H) 02/11/2022    EOSABS 0.53 (H) 02/11/2022    BASOSABS 0.10 02/11/2022     Lab Results   Component Value Date    GLUCOSE 80 02/11/2022    BUN 19 02/11/2022    CREATININE 1.95 (H) 02/11/2022     02/11/2022    K 5.2 02/11/2022    CL 99 02/11/2022    CO2 27.9 02/11/2022    CALCIUM 9.5 02/11/2022    PROTEINTOT 7.4 02/11/2022    ALBUMIN 4.80 02/11/2022    BILITOT 0.3 02/11/2022    ALKPHOS 90 02/11/2022    AST 22 02/11/2022    ALT 15 02/11/2022      No results found for: MG, PHOS, FREET4, TSH          Assessment and Plan    Diagnoses and all orders for this visit:    1. Diffuse large B-cell lymphoma of lymph nodes of multiple sites (HCC) (Primary)  Assessment & Plan:  Patient is doing well.  I see no evidence of disease recurrence by his history, physical examination, lab work or scans.  I will see him back in 6 months for continued surveillance with lab work prior.    Orders:  -     CBC & Differential; Future  -     Comprehensive Metabolic Panel; Future  -     Lactate Dehydrogenase; Future          Patient Follow Up: 6 months    Patient was given instructions and counseling regarding his condition or for health maintenance advice. Please see specific information pulled into the AVS if appropriate.     John Fuentes MD    2/15/2022

## 2022-02-15 NOTE — ASSESSMENT & PLAN NOTE
Patient is doing well.  I see no evidence of disease recurrence by his history, physical examination, lab work or scans.  I will see him back in 6 months for continued surveillance with lab work prior.

## 2022-06-06 ENCOUNTER — TELEPHONE (OUTPATIENT)
Dept: ORTHOPEDIC SURGERY | Facility: CLINIC | Age: 62
End: 2022-06-06

## 2022-06-06 NOTE — TELEPHONE ENCOUNTER
BILATERAL KNEE PAIN, LEFT > RIGHT / NO KNOWN RECENT IMAGING? / PRIOR LEFT TOTAL KNEE REPLACEMENT 2011, PRIOR RIGHT TOTAL KNEE REPLACEMENT 2013 (BOTH DR TUCKER) INDXD OP REPORT 4/28/11, 10/2/13, PN 4/26/11, 10/2/13, LABS 4/26/11, PRE-ADMISSION FORMS,

## 2022-06-14 NOTE — TELEPHONE ENCOUNTER
PATIENT CALLED TO CHECK THE STATUS OF REFERRAL; PER REFERRAL, DR PITTMAN HAS RECOMMENDED THAT PATIENT SEE DR CAITLIN ERVIN  PATIENT DOES NOT WISH TO SEE DR CAITLIN ERVIN AND WANTS TO KNOW WHY DR PITTMAN WILL NOT SEE HIM    PATIENT PHONE: [phone] (SPOUSE)

## 2022-07-25 ENCOUNTER — TELEPHONE (OUTPATIENT)
Dept: ONCOLOGY | Facility: HOSPITAL | Age: 62
End: 2022-07-25

## 2022-07-25 NOTE — TELEPHONE ENCOUNTER
Caller: Maurice Hughes    Relationship: Self    Best call back number: [phone]    What is the best time to reach you: ANYTIME    Who are you requesting to speak with (clinical staff, provider,  specific staff member): DR FUNES OR NURSE    What was the call regarding: PT STATED THAT HE FOUND A LUMP IN HIS RT GROIN AREA. HE WASN'T SURE IF HE SHOULD BE SEEN BY DR FUNES BEFORE HIS UPCOMING KNEE SURGICAL PROCEDURE ON 8/3.     PLEASE CALL TO ADVISE.     Do you require a callback: YES

## 2022-08-02 ENCOUNTER — LAB (OUTPATIENT)
Dept: ONCOLOGY | Facility: HOSPITAL | Age: 62
End: 2022-08-02

## 2022-08-02 ENCOUNTER — OFFICE VISIT (OUTPATIENT)
Dept: ONCOLOGY | Facility: HOSPITAL | Age: 62
End: 2022-08-02

## 2022-08-02 VITALS
RESPIRATION RATE: 20 BRPM | HEART RATE: 70 BPM | WEIGHT: 263.01 LBS | TEMPERATURE: 97.8 F | BODY MASS INDEX: 38.51 KG/M2 | OXYGEN SATURATION: 99 %

## 2022-08-02 DIAGNOSIS — C83.38 DIFFUSE LARGE B-CELL LYMPHOMA OF LYMPH NODES OF MULTIPLE SITES: ICD-10-CM

## 2022-08-02 DIAGNOSIS — R59.0 DEEP INGUINAL LYMPHADENOPATHY: Primary | ICD-10-CM

## 2022-08-02 LAB
ALBUMIN SERPL-MCNC: 4.5 G/DL (ref 3.5–5.2)
ALBUMIN/GLOB SERPL: 1.5 G/DL
ALP SERPL-CCNC: 106 U/L (ref 39–117)
ALT SERPL W P-5'-P-CCNC: 18 U/L (ref 1–41)
ANION GAP SERPL CALCULATED.3IONS-SCNC: 9.8 MMOL/L (ref 5–15)
AST SERPL-CCNC: 20 U/L (ref 1–40)
BASOPHILS # BLD AUTO: 0.06 10*3/MM3 (ref 0–0.2)
BASOPHILS NFR BLD AUTO: 0.7 % (ref 0–1.5)
BILIRUB SERPL-MCNC: 0.3 MG/DL (ref 0–1.2)
BUN SERPL-MCNC: 24 MG/DL (ref 8–23)
BUN/CREAT SERPL: 11.8 (ref 7–25)
CALCIUM SPEC-SCNC: 9.8 MG/DL (ref 8.6–10.5)
CHLORIDE SERPL-SCNC: 101 MMOL/L (ref 98–107)
CO2 SERPL-SCNC: 28.2 MMOL/L (ref 22–29)
CREAT SERPL-MCNC: 2.04 MG/DL (ref 0.76–1.27)
DEPRECATED RDW RBC AUTO: 45.7 FL (ref 37–54)
EGFRCR SERPLBLD CKD-EPI 2021: 35.9 ML/MIN/1.73
EOSINOPHIL # BLD AUTO: 0.38 10*3/MM3 (ref 0–0.4)
EOSINOPHIL NFR BLD AUTO: 4.3 % (ref 0.3–6.2)
ERYTHROCYTE [DISTWIDTH] IN BLOOD BY AUTOMATED COUNT: 13.2 % (ref 12.3–15.4)
GLOBULIN UR ELPH-MCNC: 3.1 GM/DL
GLUCOSE SERPL-MCNC: 168 MG/DL (ref 65–99)
HCT VFR BLD AUTO: 42.6 % (ref 37.5–51)
HGB BLD-MCNC: 14.2 G/DL (ref 13–17.7)
IMM GRANULOCYTES # BLD AUTO: 0.08 10*3/MM3 (ref 0–0.05)
IMM GRANULOCYTES NFR BLD AUTO: 0.9 % (ref 0–0.5)
LDH SERPL-CCNC: 206 U/L (ref 135–225)
LYMPHOCYTES # BLD AUTO: 1.58 10*3/MM3 (ref 0.7–3.1)
LYMPHOCYTES NFR BLD AUTO: 18 % (ref 19.6–45.3)
MCH RBC QN AUTO: 31.6 PG (ref 26.6–33)
MCHC RBC AUTO-ENTMCNC: 33.3 G/DL (ref 31.5–35.7)
MCV RBC AUTO: 94.9 FL (ref 79–97)
MONOCYTES # BLD AUTO: 0.62 10*3/MM3 (ref 0.1–0.9)
MONOCYTES NFR BLD AUTO: 7.1 % (ref 5–12)
NEUTROPHILS NFR BLD AUTO: 6.06 10*3/MM3 (ref 1.7–7)
NEUTROPHILS NFR BLD AUTO: 69 % (ref 42.7–76)
NRBC BLD AUTO-RTO: 0 /100 WBC (ref 0–0.2)
PLATELET # BLD AUTO: 256 10*3/MM3 (ref 140–450)
PMV BLD AUTO: 9.6 FL (ref 6–12)
POTASSIUM SERPL-SCNC: 4.3 MMOL/L (ref 3.5–5.2)
PROT SERPL-MCNC: 7.6 G/DL (ref 6–8.5)
RBC # BLD AUTO: 4.49 10*6/MM3 (ref 4.14–5.8)
SODIUM SERPL-SCNC: 139 MMOL/L (ref 136–145)
WBC NRBC COR # BLD: 8.78 10*3/MM3 (ref 3.4–10.8)

## 2022-08-02 PROCEDURE — 36415 COLL VENOUS BLD VENIPUNCTURE: CPT

## 2022-08-02 PROCEDURE — G0463 HOSPITAL OUTPT CLINIC VISIT: HCPCS | Performed by: NURSE PRACTITIONER

## 2022-08-02 PROCEDURE — 85025 COMPLETE CBC W/AUTO DIFF WBC: CPT

## 2022-08-02 PROCEDURE — 99214 OFFICE O/P EST MOD 30 MIN: CPT | Performed by: NURSE PRACTITIONER

## 2022-08-02 PROCEDURE — 80053 COMPREHEN METABOLIC PANEL: CPT

## 2022-08-02 PROCEDURE — 83615 LACTATE (LD) (LDH) ENZYME: CPT

## 2022-08-02 NOTE — PROGRESS NOTES
Chief Complaint    Right enlarged inguinal node.   Mirza Cheung MD Douglas, James Todd, MD      Subjective          Maurice Hughes presents to [facility] for  Right enlarged inguinal area.     History of Present Illness    Mr. Maurice Hughes presents for acute care visit for a possible enlarged inguinal LN in the right groin. Reports been present for 1 month. He has history of prior R-CHOP therapy for diagnosis of DLBCL in 2018. He completed 5 cycles of 6 cycles of RCHOP. Missed  6 cycles due to extravasation.     Recent scans in February 2022 with CT CAP with no convincing evidence for residual or recurrent lymphoma or tumor. There were no enlarged intra-abdominal or intrapelvic lymph nodes seen. The CT chest did not show any abnormalities either.     He reports he is having left knee surgery tomorrow.       Cancer Staging  No matching staging information was found for the patient.     Treatment intent: curative    Oncology/Hematology History Overview Note   4/2018 R-CHOP chemotherapy completed 4/18-5 of 6 planned cycles with the last cycle deferred secondary to extravasation     Diffuse large B-cell lymphoma of lymph nodes of multiple sites (HCC)   6/15/2021 Initial Diagnosis    Diffuse large B-cell lymphoma of lymph nodes of multiple sites (HCC)         Review of Systems   Constitutional: Positive for fatigue.   HENT: Negative.    Eyes: Negative.    Respiratory: Negative.    Cardiovascular: Negative.    Gastrointestinal: Negative.    Endocrine: Negative.    Genitourinary: Positive for flank pain (RIGHT SIDE OF GROIN).   Musculoskeletal: Positive for gait problem.   Skin: Negative.    Allergic/Immunologic: Negative.    Hematological: Positive for adenopathy (deep possible lymphadenopathy in the right groin area. ).   Psychiatric/Behavioral: Negative.    All other systems reviewed and are negative.      Current Outpatient Medications on File Prior to Visit   Medication  Sig Dispense Refill   • albuterol sulfate  (90 Base) MCG/ACT inhaler Every 4 (Four) Hours.     • carBAMazepine XR (TEGretol  XR) 200 MG 12 hr tablet Take 200 mg by mouth 2 (Two) Times a Day.     • citalopram (CeleXA) 40 MG tablet Take 40 mg by mouth Daily.     • colchicine 0.6 MG tablet      • cyclobenzaprine (FLEXERIL) 10 MG tablet Take 10 mg by mouth 3 (Three) Times a Day.     • fenofibrate 160 MG tablet Take 160 mg by mouth Daily. with food     • fluticasone (FLONASE) 50 MCG/ACT nasal spray 1 spray by Each Nare route Daily.     • gabapentin (NEURONTIN) 800 MG tablet Take 800 mg by mouth 4 (Four) Times a Day.     • glipizide (GLUCOTROL XL) 5 MG ER tablet Take 5 mg by mouth 2 (Two) Times a Day With Meals.     • HYDROcodone-Acetaminophen (ZAMCET)  MG/15ML solution Take 10 mg of hydrocodone by mouth Every 6 (Six) Hours As Needed.     • hydroxychloroquine (PLAQUENIL) 200 MG tablet Take 200 mg by mouth Daily.     • Lancets (OneTouch Delica Plus [id number]) misc USE TO TEST BLOOD SUGAR ONCE DAILY AS DIRECTED     • levocetirizine (XYZAL) 5 MG tablet Take 5 mg by mouth Every Evening.     • lisinopril-hydrochlorothiazide (PRINZIDE,ZESTORETIC) 10-12.5 MG per tablet Take 1 tablet by mouth Daily.     • LORazepam (ATIVAN) 1 MG tablet Take 1 mg by mouth Every 8 (Eight) Hours As Needed.     • metFORMIN HCl  MG/5ML Suspension Reconstituted ER Take 500 mg by mouth 2 (two) times a day.     • omeprazole (priLOSEC) 40 MG capsule Take 40 mg by mouth Daily.     • OneTouch Verio test strip USE TO TEST BLOOD SUGAR 3 TIMES DAILY     • oxyCODONE-acetaminophen (PERCOCET)  MG per tablet TAKE 1 TABLET BY MOUTH EVERY 6 HOURS (DO NOT FILL UNTIL 5/17/21)     • potassium chloride 10 MEQ CR tablet Take 10 mEq by mouth 2 (Two) Times a Day.     • pregabalin (LYRICA) 200 MG capsule Take 200 mg by mouth 3 (Three) Times a Day.     • promethazine (PHENERGAN) 25 MG tablet Take 25 mg by mouth Every 6 (Six) Hours As Needed. for  nausea     • rOPINIRole (REQUIP) 5 MG tablet TAKE 1 TABLET BY MOUTH 3 HOURS BEFORE BEDTIME ONCE DAILY FOR RESTLESS LEGS     • Stool Softener Plus Laxative 8.6-50 MG per tablet TAKE 2 TABLETS BY MOUTH IN THE EVENING AS NEEDED     • tamsulosin (FLOMAX) 0.4 MG capsule 24 hr capsule Take 1 capsule by mouth Daily.     • Tresiba FlexTouch 200 UNIT/ML solution pen-injector pen injection INJECT 30 UNITS SUBCUTANEOUS DAILY AS DIRECTED       No current facility-administered medications on file prior to visit.       No Known Allergies  Past Medical History:   Diagnosis Date   • Arthritis    • COPD (chronic obstructive pulmonary disease) (HCC)    • Depression    • Diabetes mellitus (HCC)     type 2   • Fatigue    • Lymphoma (HCC)     B CELL   • Sleep apnea      Past Surgical History:   Procedure Laterality Date   • BIOPSY ABDOMINAL MASS     • CHOLECYSTECTOMY     • JOINT REPLACEMENT Bilateral      Social History     Socioeconomic History   • Marital status:    Tobacco Use   • Smoking status: Current Every Day Smoker     Packs/day: 1.00   Substance and Sexual Activity   • Drug use: Not Currently     Family History   Problem Relation Age of Onset   • Lymphoma Mother    • Lymphoma Brother      Immunization History   Administered Date(s) Administered   • COVID-19 (PFIZER) PURPLE CAP 03/13/2021, 04/10/2021, 10/10/2021   • Covid-19 (Pfizer) Gray Cap 07/26/2022       Objective   Physical Exam    Vitals:    08/02/22 1549   Pulse: 70   Resp: 20   Temp: 97.8 °F (36.6 °C)   SpO2: 99%   Weight: 119 kg (263 lb 0.1 oz)   PainSc: 0-No pain     ECOG score: 0         ECOG: (0) Fully Active - Able to Carry On All Pre-disease Performance Without Restriction  Fall Risk Assessment was completed, and patient is at low risk for falls.  PHQ-9 Total Score: 0       The patient is  experiencing fatigue. Fatigue score: 6    PT/OT Functional Screening: PT fx screen: No needs identified  Speech Functional Screening: Speech fx screen: No needs  identified  Rehab to be ordered: Rehab to be ordered: No needs identified        Result Review :   The following data was reviewed by: CHELSEA Pineda on 08/02/2022:  Lab Results   Component Value Date    HGB 13.8 02/11/2022    HCT 41.4 02/11/2022    MCV 93.7 02/11/2022     02/11/2022    WBC 12.31 (H) 02/11/2022    NEUTROABS 7.82 (H) 02/11/2022    LYMPHSABS 2.81 02/11/2022    MONOSABS 0.94 (H) 02/11/2022    EOSABS 0.53 (H) 02/11/2022    BASOSABS 0.10 02/11/2022     Lab Results   Component Value Date    GLUCOSE 80 02/11/2022    BUN 26 (H) 07/26/2022    CREATININE 2.1 (H) 07/26/2022     07/26/2022    K 4.6 07/26/2022     07/26/2022    CO2 27 07/26/2022    CALCIUM 9.0 07/26/2022    PROTEINTOT 7.4 02/11/2022    ALBUMIN 4.80 02/11/2022    BILITOT 0.3 02/11/2022    ALKPHOS 90 02/11/2022    AST 22 02/11/2022    ALT 15 02/11/2022          Assessment and Plan    Diagnoses and all orders for this visit:    1. Deep inguinal lymphadenopathy (Primary)  -     US Pelvis Limited; Future    2. Diffuse large B-cell lymphoma of lymph nodes of multiple sites (HCC)  -     CBC & Differential; Future  -     Comprehensive Metabolic Panel; Future  -     Lactate Dehydrogenase; Future    On clinical exam, I personally can not palpate or appreciate any lymphadenopathy at the area of concern, but the patient feels a knot in the area of the right inguinal area.     Will check an pelvis US  Limited to evaluate the area. Will also check lab work today as well. He denies any night sweats, weight loss, or fevers at present. No other palpable lymphadenopathy is identified on exam.     He can follow up in 2 weeks with Dr. Fuentes to review the US and lab work..         Patient Follow Up: 2 weeks with Dr. Fuentes.     Patient was given instructions and counseling regarding his condition or for health maintenance advice. Please see specific information pulled into the AVS if appropriate.     Rosa Ricci,  APRN    8/2/2022

## 2022-08-05 ENCOUNTER — APPOINTMENT (OUTPATIENT)
Dept: ULTRASOUND IMAGING | Facility: HOSPITAL | Age: 62
End: 2022-08-05

## 2022-08-11 ENCOUNTER — APPOINTMENT (OUTPATIENT)
Dept: ULTRASOUND IMAGING | Facility: HOSPITAL | Age: 62
End: 2022-08-11

## 2022-08-22 ENCOUNTER — HOSPITAL ENCOUNTER (OUTPATIENT)
Dept: ULTRASOUND IMAGING | Facility: HOSPITAL | Age: 62
End: 2022-08-22

## 2022-09-12 ENCOUNTER — HOSPITAL ENCOUNTER (OUTPATIENT)
Dept: ULTRASOUND IMAGING | Facility: HOSPITAL | Age: 62
Discharge: HOME | End: 2022-09-12
Admitting: NURSE PRACTITIONER

## 2022-09-12 DIAGNOSIS — R59.0 DEEP INGUINAL LYMPHADENOPATHY: ICD-10-CM

## 2022-09-12 PROCEDURE — 76999 ECHO EXAMINATION PROCEDURE: CPT

## 2022-09-13 NOTE — PROGRESS NOTES
Called to phone Mr. Hughes his normal US of the inguinal area. Spoke with patient regarding his results.

## 2024-02-09 ENCOUNTER — TELEPHONE (OUTPATIENT)
Dept: ORTHOPEDIC SURGERY | Facility: CLINIC | Age: 64
End: 2024-02-09
Payer: MEDICARE

## 2024-05-29 ENCOUNTER — OFFICE VISIT (OUTPATIENT)
Dept: ORTHOPEDIC SURGERY | Facility: CLINIC | Age: 64
End: 2024-05-29
Payer: MEDICARE

## 2024-05-29 VITALS
HEART RATE: 66 BPM | BODY MASS INDEX: 37.03 KG/M2 | OXYGEN SATURATION: 94 % | WEIGHT: 250 LBS | DIASTOLIC BLOOD PRESSURE: 77 MMHG | HEIGHT: 69 IN | SYSTOLIC BLOOD PRESSURE: 185 MMHG

## 2024-05-29 DIAGNOSIS — Z96.651 HISTORY OF TOTAL KNEE ARTHROPLASTY, RIGHT: ICD-10-CM

## 2024-05-29 DIAGNOSIS — M25.561 RIGHT KNEE PAIN, UNSPECIFIED CHRONICITY: Primary | ICD-10-CM

## 2024-05-29 NOTE — PROGRESS NOTES
"Chief Complaint  Initial Evaluation of the Right Knee     Subjective      Maurice Hughes presents to [facility] for initial evaluation of the right knee.  He had a right total knee arthroplasty in 2011 or 2013.  He has not been able to weight bear on the knee for awhile.  He has pain in the right knee for over 2 years.  He notes that his knee cannot support his weight.      No Known Allergies     Social History     Socioeconomic History    Marital status:    Tobacco Use    Smoking status: Every Day     Current packs/day: 1.00     Types: Cigarettes    Smokeless tobacco: Never   Vaping Use    Vaping status: Never Used   Substance and Sexual Activity    Drug use: Not Currently        I reviewed the patient's chief complaint, history of present illness, review of systems, past medical history, surgical history, family history, social history, medications, and allergy list.     Review of Systems     Constitutional: Denies fevers, chills, weight loss  Cardiovascular: Denies chest pain, shortness of breath  Skin: Denies rashes, acute skin changes  Neurologic: Denies headache, loss of consciousness      Vital Signs:   BP (!) 185/77   Pulse 66   Ht 175.3 cm (69\")   Wt 113 kg (250 lb)   SpO2 94%   BMI 36.92 kg/m²          Physical Exam  General: Alert. No acute distress    Ortho Exam        RIGHT KNEE Flexion 100. Extension -6. . Neurovascularly intact.Positive EHL, FHL, HS and TA. Sensation intact to light touch all 5 nerves of the foot. Ambulates with Antalgic gait. Patella is not well tracking. Calf supple, non-tender. Positive tenderness to the medial joint line. Positive tenderness to the lateral joint line.     Procedures        Imaging Results (Most Recent)       Procedure Component Value Units Date/Time    XR Knee 3 View Right [[id number]] Resulted: 05/29/24 1424     Updated: 05/29/24 1436             Result Review :     X-Ray Report:  Right knee X-Ray  Indication: Evaluation of " the right knee  AP/Lateral and [place] view(s)  Findings: Tibia base place loose and bone loss on the medial column.   Prior studies available for comparison: no             Assessment and Plan     Diagnoses and all orders for this visit:    1. Right knee pain, unspecified chronicity (Primary)  -     XR Knee 3 View Right  -     Ambulatory Referral to Orthopedic Surgery    2. History of total knee arthroplasty, right        Discussed the treatment plan with the patient. I reviewed the X-rays that were obtained today with the patient.    Refer to dr Madden      Educated on risk of smoking/nicotine. Discussed options for smoking cessation. and Call or return if worsening symptoms.    Follow Up     PRN      Patient was given instructions and counseling regarding his condition or for health maintenance advice. Please see specific information pulled into the AVS if appropriate.     Scribed for Wan Gee MD by Madhavi Sherman MA.  05/29/24   14:30 EDT      I have personally performed the services described in this document as scribed by the above individual and it is both accurate and complete. Wan Gee MD 05/29/24